# Patient Record
Sex: FEMALE | Race: WHITE | Employment: UNEMPLOYED | URBAN - METROPOLITAN AREA
[De-identification: names, ages, dates, MRNs, and addresses within clinical notes are randomized per-mention and may not be internally consistent; named-entity substitution may affect disease eponyms.]

---

## 2017-06-26 ENCOUNTER — OFFICE VISIT - HEALTHEAST (OUTPATIENT)
Dept: FAMILY MEDICINE | Facility: CLINIC | Age: 27
End: 2017-06-26

## 2017-06-26 DIAGNOSIS — F32.A DEPRESSION: ICD-10-CM

## 2017-06-26 DIAGNOSIS — F17.200 TOBACCO USE DISORDER: ICD-10-CM

## 2017-06-26 DIAGNOSIS — Z01.818 PRE-OPERATIVE GENERAL PHYSICAL EXAMINATION: ICD-10-CM

## 2017-06-26 DIAGNOSIS — E66.9 OBESITY (BMI 30-39.9): ICD-10-CM

## 2017-06-26 DIAGNOSIS — K58.2 IRRITABLE BOWEL SYNDROME WITH BOTH CONSTIPATION AND DIARRHEA: ICD-10-CM

## 2017-06-26 LAB
ATRIAL RATE - MUSE: 60 BPM
DIASTOLIC BLOOD PRESSURE - MUSE: NORMAL MMHG
INTERPRETATION ECG - MUSE: NORMAL
P AXIS - MUSE: 25 DEGREES
PR INTERVAL - MUSE: 158 MS
QRS DURATION - MUSE: 90 MS
QT - MUSE: 382 MS
QTC - MUSE: 382 MS
R AXIS - MUSE: 72 DEGREES
SYSTOLIC BLOOD PRESSURE - MUSE: NORMAL MMHG
T AXIS - MUSE: 45 DEGREES
VENTRICULAR RATE- MUSE: 60 BPM

## 2017-06-26 ASSESSMENT — MIFFLIN-ST. JEOR: SCORE: 1809.48

## 2017-07-06 ENCOUNTER — RECORDS - HEALTHEAST (OUTPATIENT)
Dept: ADMINISTRATIVE | Facility: OTHER | Age: 27
End: 2017-07-06

## 2017-07-06 ENCOUNTER — APPOINTMENT (OUTPATIENT)
Dept: GENERAL RADIOLOGY | Facility: CLINIC | Age: 27
End: 2017-07-06
Attending: EMERGENCY MEDICINE
Payer: COMMERCIAL

## 2017-07-06 ENCOUNTER — HOSPITAL ENCOUNTER (EMERGENCY)
Facility: CLINIC | Age: 27
Discharge: HOME | End: 2017-07-07
Attending: EMERGENCY MEDICINE | Admitting: EMERGENCY MEDICINE
Payer: COMMERCIAL

## 2017-07-06 DIAGNOSIS — R50.9 FEVER, UNSPECIFIED: ICD-10-CM

## 2017-07-06 LAB
ALBUMIN SERPL-MCNC: 3.5 G/DL (ref 3.4–5)
ALBUMIN UR-MCNC: NEGATIVE MG/DL
ALP SERPL-CCNC: 56 U/L (ref 40–150)
ALT SERPL W P-5'-P-CCNC: 25 U/L (ref 0–50)
ANION GAP SERPL CALCULATED.3IONS-SCNC: 6 MMOL/L (ref 3–14)
APPEARANCE UR: CLEAR
AST SERPL W P-5'-P-CCNC: 10 U/L (ref 0–45)
BACTERIA #/AREA URNS HPF: ABNORMAL /HPF
BASOPHILS # BLD AUTO: 0 10E9/L (ref 0–0.2)
BASOPHILS NFR BLD AUTO: 0.1 %
BILIRUB SERPL-MCNC: 0.7 MG/DL (ref 0.2–1.3)
BILIRUB UR QL STRIP: NEGATIVE
BUN SERPL-MCNC: 11 MG/DL (ref 7–30)
CALCIUM SERPL-MCNC: 8.8 MG/DL (ref 8.5–10.1)
CHLORIDE SERPL-SCNC: 103 MMOL/L (ref 94–109)
CO2 SERPL-SCNC: 27 MMOL/L (ref 20–32)
COLOR UR AUTO: YELLOW
CREAT SERPL-MCNC: 0.97 MG/DL (ref 0.52–1.04)
DIFFERENTIAL METHOD BLD: NORMAL
EOSINOPHIL # BLD AUTO: 0 10E9/L (ref 0–0.7)
EOSINOPHIL NFR BLD AUTO: 0.1 %
ERYTHROCYTE [DISTWIDTH] IN BLOOD BY AUTOMATED COUNT: 12.2 % (ref 10–15)
GFR SERPL CREATININE-BSD FRML MDRD: 68 ML/MIN/1.7M2
GLUCOSE SERPL-MCNC: 90 MG/DL (ref 70–99)
GLUCOSE UR STRIP-MCNC: NEGATIVE MG/DL
HCT VFR BLD AUTO: 38.1 % (ref 35–47)
HGB BLD-MCNC: 13.1 G/DL (ref 11.7–15.7)
HGB UR QL STRIP: ABNORMAL
IMM GRANULOCYTES # BLD: 0 10E9/L (ref 0–0.4)
IMM GRANULOCYTES NFR BLD: 0.3 %
KETONES UR STRIP-MCNC: 40 MG/DL
LACTATE BLD-SCNC: 1 MMOL/L (ref 0.7–2.1)
LEUKOCYTE ESTERASE UR QL STRIP: ABNORMAL
LYMPHOCYTES # BLD AUTO: 1.1 10E9/L (ref 0.8–5.3)
LYMPHOCYTES NFR BLD AUTO: 13.7 %
MCH RBC QN AUTO: 31.6 PG (ref 26.5–33)
MCHC RBC AUTO-ENTMCNC: 34.4 G/DL (ref 31.5–36.5)
MCV RBC AUTO: 92 FL (ref 78–100)
MONOCYTES # BLD AUTO: 0.6 10E9/L (ref 0–1.3)
MONOCYTES NFR BLD AUTO: 7.7 %
MUCOUS THREADS #/AREA URNS LPF: PRESENT /LPF
NEUTROPHILS # BLD AUTO: 6.2 10E9/L (ref 1.6–8.3)
NEUTROPHILS NFR BLD AUTO: 78.1 %
NITRATE UR QL: NEGATIVE
NRBC # BLD AUTO: 0 10*3/UL
NRBC BLD AUTO-RTO: 0 /100
PH UR STRIP: 6 PH (ref 5–7)
PLATELET # BLD AUTO: 197 10E9/L (ref 150–450)
POTASSIUM SERPL-SCNC: 3.4 MMOL/L (ref 3.4–5.3)
PROT SERPL-MCNC: 7.1 G/DL (ref 6.8–8.8)
RBC # BLD AUTO: 4.15 10E12/L (ref 3.8–5.2)
RBC #/AREA URNS AUTO: <1 /HPF (ref 0–2)
SODIUM SERPL-SCNC: 137 MMOL/L (ref 133–144)
SP GR UR STRIP: 1.02 (ref 1–1.03)
SQUAMOUS #/AREA URNS AUTO: 3 /HPF (ref 0–1)
TRANS CELLS #/AREA URNS HPF: <1 /HPF (ref 0–1)
URN SPEC COLLECT METH UR: ABNORMAL
UROBILINOGEN UR STRIP-MCNC: 2 MG/DL (ref 0–2)
WBC # BLD AUTO: 7.9 10E9/L (ref 4–11)
WBC #/AREA URNS AUTO: 1 /HPF (ref 0–2)

## 2017-07-06 PROCEDURE — 80053 COMPREHEN METABOLIC PANEL: CPT | Performed by: EMERGENCY MEDICINE

## 2017-07-06 PROCEDURE — 25000128 H RX IP 250 OP 636: Performed by: EMERGENCY MEDICINE

## 2017-07-06 PROCEDURE — 25000132 ZZH RX MED GY IP 250 OP 250 PS 637: Performed by: EMERGENCY MEDICINE

## 2017-07-06 PROCEDURE — 87040 BLOOD CULTURE FOR BACTERIA: CPT | Performed by: EMERGENCY MEDICINE

## 2017-07-06 PROCEDURE — 87086 URINE CULTURE/COLONY COUNT: CPT | Performed by: EMERGENCY MEDICINE

## 2017-07-06 PROCEDURE — 84703 CHORIONIC GONADOTROPIN ASSAY: CPT | Performed by: EMERGENCY MEDICINE

## 2017-07-06 PROCEDURE — 99285 EMERGENCY DEPT VISIT HI MDM: CPT | Mod: Z6 | Performed by: EMERGENCY MEDICINE

## 2017-07-06 PROCEDURE — 96360 HYDRATION IV INFUSION INIT: CPT

## 2017-07-06 PROCEDURE — 81001 URINALYSIS AUTO W/SCOPE: CPT | Performed by: EMERGENCY MEDICINE

## 2017-07-06 PROCEDURE — 85025 COMPLETE CBC W/AUTO DIFF WBC: CPT | Performed by: EMERGENCY MEDICINE

## 2017-07-06 PROCEDURE — 71010 XR CHEST PORT 1 VW: CPT

## 2017-07-06 PROCEDURE — 83605 ASSAY OF LACTIC ACID: CPT | Performed by: EMERGENCY MEDICINE

## 2017-07-06 PROCEDURE — 99285 EMERGENCY DEPT VISIT HI MDM: CPT | Mod: 25

## 2017-07-06 RX ORDER — ACETAMINOPHEN 500 MG
1000 TABLET ORAL ONCE
Status: COMPLETED | OUTPATIENT
Start: 2017-07-06 | End: 2017-07-06

## 2017-07-06 RX ORDER — KETOROLAC TROMETHAMINE 10 MG/1
30 TABLET, FILM COATED ORAL EVERY 8 HOURS PRN
COMMUNITY
End: 2020-12-10

## 2017-07-06 RX ORDER — SODIUM CHLORIDE 9 MG/ML
1000 INJECTION, SOLUTION INTRAVENOUS CONTINUOUS
Status: DISCONTINUED | OUTPATIENT
Start: 2017-07-06 | End: 2017-07-07 | Stop reason: HOSPADM

## 2017-07-06 RX ADMIN — SODIUM CHLORIDE 1000 ML: 9 INJECTION, SOLUTION INTRAVENOUS at 22:37

## 2017-07-06 RX ADMIN — ACETAMINOPHEN 1000 MG: 500 TABLET, FILM COATED ORAL at 22:54

## 2017-07-06 RX ADMIN — SODIUM CHLORIDE 1000 ML: 9 INJECTION, SOLUTION INTRAVENOUS at 23:11

## 2017-07-06 ASSESSMENT — ENCOUNTER SYMPTOMS
SHORTNESS OF BREATH: 0
FREQUENCY: 0
SORE THROAT: 0
DYSURIA: 0
FEVER: 1
HEMATURIA: 0
COUGH: 1

## 2017-07-06 NOTE — ED AVS SNAPSHOT
[facility], Emergency Department    [address] [address]    Phone:  [phone]                                       Jennifer Cantu   MRN: [id number]    Department:  [facility], Emergency Department   Date of Visit:  7/6/2017           Patient Information     Date Of Birth          1990        Your diagnoses for this visit were:     Fever, unspecified        You were seen by Phani Lopez MD and Ministerio Quintanilla MD.      24 Hour Appointment Hotline       To make an appointment at any [place] clinic, call [phone] ([phone]). If you don't have a family doctor or clinic, we will help you find one. [place] clinics are conveniently located to serve the needs of you and your family.             Review of your medicines      Our records show that you are taking the medicines listed below. If these are incorrect, please call your family doctor or clinic.        Dose / Directions Last dose taken    CODEINE SULFATE PO   Dose:  50 mg        Take 50 mg by mouth daily   Refills:  0        ketorolac 10 MG tablet   Commonly known as:  TORADOL   Dose:  30 mg        Take 30 mg by mouth every 8 hours as needed for moderate pain   Refills:  0        LEVAQUIN PO   Dose:  750 mg        Take 750 mg by mouth daily   Refills:  0        melatonin 3 MG tablet   Dose:  3 mg        Take 3 mg by mouth nightly as needed for sleep   Refills:  0        traZODone 50 MG tablet   Commonly known as:  DESYREL   Dose:   mg   Quantity:  60 tablet        Take 1-2 tablets ( mg) by mouth nightly as needed for sleep   Refills:  1                Procedures and tests performed during your visit     Blood Culture ONE site    Blood culture    CBC with platelets differential    CT Abdomen Pelvis w Contrast    Cardiac Continuous Monitoring    Comprehensive metabolic panel    HCG qualitative    Lactic acid    Patient care order    Peripheral IV: Standard    Pulse oximetry nursing    UA  "with Microscopic    Urine Culture Aerobic Bacterial    XR Chest Port 1 View      Orders Needing Specimen Collection     None      Pending Results     Date and Time Order Name Status Description    7/6/2017 2259 CT Abdomen Pelvis w Contrast Preliminary     7/6/2017 2243 XR Chest Port 1 View Preliminary     7/6/2017 2243 Blood culture Preliminary     7/6/2017 2243 Urine Culture Aerobic Bacterial Preliminary     7/6/2017 2236 Blood Culture ONE site Preliminary             Pending Culture Results     Date and Time Order Name Status Description    7/6/2017 2243 Blood culture Preliminary     7/6/2017 2243 Urine Culture Aerobic Bacterial Preliminary     7/6/2017 2236 Blood Culture ONE site Preliminary             Pending Results Instructions     If you had any lab results that were not finalized at the time of your Discharge, you can call the ED Lab Result RN at [phone]. You will be contacted by this team for any positive Lab results or changes in treatment. The nurses are available 7 days a week from 10A to 6:30P.  You can leave a message 24 hours per day and they will return your call.        Thank you for choosing [facility]       Thank you for choosing [facility] for your care. Our goal is always to provide you with excellent care. Hearing back from our patients is one way we can continue to improve our services. Please take a few minutes to complete the written survey that you may receive in the mail after you visit with us. Thank you!        [employer] Information     [employer] lets you send messages to your doctor, view your test results, renew your prescriptions, schedule appointments and more. To sign up, go to [email or web address] . Click on \"Log in\" on the left side of the screen, which will take you to the Welcome page. Then click on \"Sign up Now\" on the right side of the page.     You will be asked to enter the access code listed below, as well as some personal information. Please follow the directions to " create your username and password.     Your access code is: [email or web address]  Expires: 10/5/2017  3:06 AM     Your access code will  in 90 days. If you need help or a new code, please call your [facility] clinic or [phone].        Care EveryWhere ID     This is your Care EveryWhere ID. This could be used by other organizations to access your [facility] medical records  [id number]        Equal Access to Services     [facility]: Hadii keisha salgado hadasho Soomaali, waaxda luqadaha, qaybta kaalmada adeegyada, clarita greenwood . So [facility] [phone].    ATENCIÓN: Si habla español, tiene a santoro disposición servicios gratuitos de asistencia lingüística. Llame al [phone].    We comply with applicable federal civil rights laws and [place] laws. We do not discriminate on the basis of race, color, national origin, age, disability sex, sexual orientation or gender identity.            After Visit Summary       This is your record. Keep this with you and show to your community pharmacist(s) and doctor(s) at your next visit.

## 2017-07-06 NOTE — ED AVS SNAPSHOT
[facility], [place], Emergency Department    [address] [place] [address]    Phone:  [phone]                                       Jennifer Cantu   MRN: [id number]    Department:  [facility], Emergency Department   Date of Visit:  7/6/2017           After Visit Summary Signature Page     I have received my discharge instructions, and my questions have been answered. I have discussed any challenges I see with this plan with the nurse or doctor.    ..........................................................................................................................................  Patient/Patient Representative Signature      ..........................................................................................................................................  Patient Representative Print Name and Relationship to Patient    ..................................................               ................................................  Date                                            Time    ..........................................................................................................................................  Reviewed by Signature/Title    ...................................................              ..............................................  Date                                                            Time

## 2017-07-07 ENCOUNTER — APPOINTMENT (OUTPATIENT)
Dept: CT IMAGING | Facility: CLINIC | Age: 27
End: 2017-07-07
Attending: EMERGENCY MEDICINE
Payer: COMMERCIAL

## 2017-07-07 VITALS
BODY MASS INDEX: 34.8 KG/M2 | RESPIRATION RATE: 18 BRPM | WEIGHT: 229.6 LBS | TEMPERATURE: 98.4 F | HEART RATE: 97 BPM | DIASTOLIC BLOOD PRESSURE: 59 MMHG | HEIGHT: 68 IN | OXYGEN SATURATION: 99 % | SYSTOLIC BLOOD PRESSURE: 136 MMHG

## 2017-07-07 LAB — HCG SERPL QL: NEGATIVE

## 2017-07-07 PROCEDURE — 25000125 ZZHC RX 250: Performed by: EMERGENCY MEDICINE

## 2017-07-07 PROCEDURE — 25000128 H RX IP 250 OP 636: Performed by: EMERGENCY MEDICINE

## 2017-07-07 PROCEDURE — 74177 CT ABD & PELVIS W/CONTRAST: CPT

## 2017-07-07 RX ORDER — IOPAMIDOL 755 MG/ML
135 INJECTION, SOLUTION INTRAVASCULAR ONCE
Status: COMPLETED | OUTPATIENT
Start: 2017-07-07 | End: 2017-07-07

## 2017-07-07 RX ADMIN — IOPAMIDOL 135 ML: 755 INJECTION, SOLUTION INTRAVENOUS at 00:29

## 2017-07-07 RX ADMIN — SODIUM CHLORIDE, PRESERVATIVE FREE 84 ML: 5 INJECTION INTRAVENOUS at 00:29

## 2017-07-07 NOTE — ED PROVIDER NOTES
Ms. Cantu was signed over to my by my colleague Dr. oLpez at 0120 with plan for follow up on Surgical recommendations with likely disposition to home.  I did rediscuss the patients case with surgery who have evaluated her CT, lab studies, U/A, CXR and have completed a bedside examination with recommendations for the patient to return to home with follow up in Bariatric surgery on Tuesday of the upcoming week.  I did  the patient and mother as to common sources of infection and indications for emergent return.     Ministerio Quintanilla MD  07/07/17 6856

## 2017-07-07 NOTE — ED NOTES
Patient presents with c/o fever. Patient had vertical sleeve gastrectomy done in [place] on 7/4/17, returned today and developed fever. Temp max of 103 at home and temp of 102 during triage. Has not taken tylenol.

## 2017-07-07 NOTE — ED PROVIDER NOTES
History     Chief Complaint   Patient presents with     Fever     HPI  Jennifer Cantu is a 27 year old female who states she went to [place] to have bariatric surgery performed because it was less expensive.  Patient states that she spent 7/2/17 in a hotel room and had surgery the following evening, on 7/3/17.  Patient states that she was watched in the hotel by the surgical assistant until the morning of 7/6/17, at which point she was sent to the airport to [place].  Patient states that she started feeling feverish [place] in [facility].  Patient states that she made it to the [facility] and came to the ER immediately.  Patient states she has had a mild cough, but denies any sore throat, shortness of breath or chest pain.  Patient denies any increased significant pain at the surgical site.  She denies any UTI symptoms.    This part of the medical record was transcribed by Katie Gallegos Medical Scribe, from a dictation done by Phani Lopez MD.      Past Medical History:   Diagnosis Date     GI symptoms     PCP w/u pending. Diarrhea, constipation, abdominal cramping noted.      Substance abuse     Alcohol       Past Surgical History:   Procedure Laterality Date     CHOLECYSTECTOMY  2013     COLONOSCOPY  2015 2/2 chronic GI symptoms     GASTRECTOMY  07/04/2017       Family History   Problem Relation Age of Onset     CANCER No family hx of        Social History   Substance Use Topics     Smoking status: Current Every Day Smoker     Packs/day: 0.50     Years: 10.00     Types: Cigarettes     Smokeless tobacco: Never Used     Alcohol use 0.0 oz/week     0 Standard drinks or equivalent per week      Comment: sober for a year until this - Trying to recover from alcohol. Starte drinking 2 weeks ago - unable to stop by herself     Previous Medications    CODEINE SULFATE PO    Take 50 mg by mouth daily    KETOROLAC (TORADOL) 10 MG TABLET    Take 30 mg by mouth every 8 hours as  "needed for moderate pain    LEVOFLOXACIN (LEVAQUIN PO)    Take 750 mg by mouth daily    MELATONIN 3 MG TABLET    Take 3 mg by mouth nightly as needed for sleep    TRAZODONE (DESYREL) 50 MG TABLET    Take 1-2 tablets ( mg) by mouth nightly as needed for sleep        Allergies   Allergen Reactions     Amoxicillin Unknown     Clindamycin Rash     Penicillins Rash      I have reviewed the Medications, Allergies, Past Medical and Surgical History, and Social History in the Epic system.    Review of Systems   Constitutional: Positive for fever.   HENT: Negative for sore throat.    Respiratory: Positive for cough. Negative for shortness of breath.    Cardiovascular: Negative for chest pain.   Genitourinary: Negative for dysuria, frequency, hematuria and urgency.   All other systems reviewed and are negative.      Physical Exam   BP: 136/59  Heart Rate: 105  Temp: 102  F (38.9  C)  Resp: 18  Height: 172.7 cm (5' 8\")  Weight: 104.1 kg (229 lb 9.6 oz)  SpO2: 98 %  Physical Exam   Constitutional: She is oriented to person, place, and time.   Alert and conversant   HENT:   Head: Atraumatic.   Mouth/Throat: Oropharynx is clear and moist.   Eyes: EOM are normal. Pupils are equal, round, and reactive to light.   Neck: Neck supple.   Cardiovascular: Normal heart sounds.    Pulmonary/Chest: Breath sounds normal.   Abdominal: Soft. There is tenderness (minimal epigastric). There is no rebound and no guarding.   Musculoskeletal: She exhibits no edema or tenderness.   Neurological: She is alert and oriented to person, place, and time.   Grossly intact and symmetric   Skin: Skin is warm.   Psychiatric: She has a normal mood and affect.       ED Course     ED Course     Procedures        IV established for blood draw    Medications   0.9% sodium chloride BOLUS (0 mLs Intravenous Stopped 7/7/17 0012)     Followed by   0.9% sodium chloride infusion (not administered)   0.9% sodium chloride BOLUS (0 mLs Intravenous Stopped 7/6/17 " 2307)   acetaminophen (TYLENOL) tablet 1,000 mg (1,000 mg Oral Given 7/6/17 2254)   iohexol (OMNIPAQUE) solution 25 mL (25 mLs Oral Given 7/6/17 2309)   iopamidol (ISOVUE-370) solution 135 mL (135 mLs Intravenous Given 7/7/17 0029)   sodium chloride 0.9 % for CT scan flush dose 84 mL (84 mLs Intravenous Given 7/7/17 0029)       Results for orders placed or performed during the hospital encounter of 07/06/17   XR Chest Port 1 View    Narrative    Exam: XR CHEST PORT 1 VW  7/6/2017 10:54 PM      History: Fever    Comparison: None    Findings: The cardiac silhouette is within normal limits. Trachea is  midline. Pulmonary vasculature is unremarkable. There is no pleural  effusion. No pneumothorax. No new focal pulmonary airspace opacities.  Visualized abdomen is unremarkable.      Impression    Impression: Clear chest.   CT Abdomen Pelvis w Contrast    Narrative    Exam: CT abdomen pelvis with contrast 7/7/2017 12:28 AM.    History: 3 days status post gastric sleeve with fever, rule out  complication.    Comparison: None.    Technique: CT images from the lung bases through the symphysis pubis  after the uneventful administration of IV and oral contrast.    FINDINGS: Trace bibasilar atelectasis. Tiny left pleural effusion.  Heart size is normal. No pericardial effusion.    Postoperative changes of gastric sleeve. There is mild fat stranding  surrounding the postoperative changes.    Focal fatty infiltration near the falciform ligament of the liver. The  spleen, adrenals, and pancreas are unremarkable. Prior  cholecystectomy. No hydronephrosis or hydroureter. Unremarkable  urinary bladder. IUD within the uterus. Subtle nonspecific free fluid  in the pelvis and right lower quadrant. Normal appendix. There are a  few mildly dilated loops of small bowel within the left upper  quadrant. Patent abdominal vasculature. No significant retroperitoneal  or mesenteric lymphadenopathy. No free air. Tiny fat-containing  umbilical  hernia.    No suspicious bony lesions.      Impression    IMPRESSION:   1. Postoperative changes of gastric sleeve. There are subtle  inflammatory changes surrounding the stomach, likely representing  normal/expected operative changes.  2. Few mildly dilated loops of small bowel within the left upper  quadrant, likely representing focal ileus.  3. Subtle nonspecific free fluid within the right lower quadrant and  pelvis.   Comprehensive metabolic panel   Result Value Ref Range    Sodium 137 133 - 144 mmol/L    Potassium 3.4 3.4 - 5.3 mmol/L    Chloride 103 94 - 109 mmol/L    Carbon Dioxide 27 20 - 32 mmol/L    Anion Gap 6 3 - 14 mmol/L    Glucose 90 70 - 99 mg/dL    Urea Nitrogen 11 7 - 30 mg/dL    Creatinine 0.97 0.52 - 1.04 mg/dL    GFR Estimate 68 >60 mL/min/1.7m2    GFR Estimate If Black 82 >60 mL/min/1.7m2    Calcium 8.8 8.5 - 10.1 mg/dL    Bilirubin Total 0.7 0.2 - 1.3 mg/dL    Albumin 3.5 3.4 - 5.0 g/dL    Protein Total 7.1 6.8 - 8.8 g/dL    Alkaline Phosphatase 56 40 - 150 U/L    ALT 25 0 - 50 U/L    AST 10 0 - 45 U/L   CBC with platelets differential   Result Value Ref Range    WBC 7.9 4.0 - 11.0 10e9/L    RBC Count 4.15 3.8 - 5.2 10e12/L    Hemoglobin 13.1 11.7 - 15.7 g/dL    Hematocrit 38.1 35.0 - 47.0 %    MCV 92 78 - 100 fl    MCH 31.6 26.5 - 33.0 pg    MCHC 34.4 31.5 - 36.5 g/dL    RDW 12.2 10.0 - 15.0 %    Platelet Count 197 150 - 450 10e9/L    Diff Method Automated Method     % Neutrophils 78.1 %    % Lymphocytes 13.7 %    % Monocytes 7.7 %    % Eosinophils 0.1 %    % Basophils 0.1 %    % Immature Granulocytes 0.3 %    Nucleated RBCs 0 0 /100    Absolute Neutrophil 6.2 1.6 - 8.3 10e9/L    Absolute Lymphocytes 1.1 0.8 - 5.3 10e9/L    Absolute Monocytes 0.6 0.0 - 1.3 10e9/L    Absolute Eosinophils 0.0 0.0 - 0.7 10e9/L    Absolute Basophils 0.0 0.0 - 0.2 10e9/L    Abs Immature Granulocytes 0.0 0 - 0.4 10e9/L    Absolute Nucleated RBC 0.0    Lactic acid   Result Value Ref Range    Lactic Acid 1.0 0.7 -  2.1 mmol/L   UA with Microscopic   Result Value Ref Range    Color Urine Yellow     Appearance Urine Clear     Glucose Urine Negative NEG mg/dL    Bilirubin Urine Negative NEG    Ketones Urine 40 (A) NEG mg/dL    Specific Gravity Urine 1.016 1.003 - 1.035    Blood Urine Trace (A) NEG    pH Urine 6.0 5.0 - 7.0 pH    Protein Albumin Urine Negative NEG mg/dL    Urobilinogen mg/dL 2.0 0.0 - 2.0 mg/dL    Nitrite Urine Negative NEG    Leukocyte Esterase Urine Small (A) NEG    Source Midstream Urine     WBC Urine 1 0 - 2 /HPF    RBC Urine <1 0 - 2 /HPF    Bacteria Urine Few (A) NEG /HPF    Squamous Epithelial /HPF Urine 3 (H) 0 - 1 /HPF    Transitional Epi <1 0 - 1 /HPF    Mucous Urine Present (A) NEG /LPF   HCG qualitative   Result Value Ref Range    HCG Qualitative Serum Negative NEG   Blood Culture ONE site   Result Value Ref Range    Specimen Description Blood Right Arm     Culture Micro No growth after 1 hour     Micro Report Status Pending    Urine Culture Aerobic Bacterial   Result Value Ref Range    Specimen Description Midstream Urine     Special Requests Specimen received in preservative     Culture Micro Pending     Micro Report Status Pending    Blood culture   Result Value Ref Range    Specimen Description Blood Left Arm     Culture Micro No growth after 1 hour     Micro Report Status Pending        Labs Ordered and Resulted from Time of ED Arrival Up to the Time of Departure from the ED   ROUTINE UA WITH MICROSCOPIC - Abnormal; Notable for the following:        Result Value    Ketones Urine 40 (*)     Blood Urine Trace (*)     Leukocyte Esterase Urine Small (*)     Bacteria Urine Few (*)     Squamous Epithelial /HPF Urine 3 (*)     Mucous Urine Present (*)     All other components within normal limits   COMPREHENSIVE METABOLIC PANEL   CBC WITH PLATELETS DIFFERENTIAL   LACTIC ACID WHOLE BLOOD   HCG QUALITATIVE   PERIPHERAL IV CATHETER   PULSE OXIMETRY NURSING   CARDIAC CONTINUOUS MONITORING   PATIENT CARE  ORDER   MEASURE URINE OUTPUT   BLOOD CULTURE   URINE CULTURE AEROBIC BACTERIAL   BLOOD CULTURE         Assessments & Plan (with Medical Decision Making)     I have reviewed the nursing notes.    Workup fairly unremarkable but the case was discussed with surgery who will come to the ER to evaluate the patient.  At this time the patient's temperature has resolved and it is currently 98.4 orally.  Consideration for observation overnight upstairs will be undertaken.    I have reviewed the findings, diagnosis, and plan with the patient.    Working diagnoses:   Fever, unspecified     Surgery consultation pending.    Phani Lopez MD    7/6/2017   [facility], [place], EMERGENCY DEPARTMENT     Phani Lopez MD  07/07/17 0118

## 2017-07-07 NOTE — CONSULTS
GENERAL SURGERY H&P/CONSULT  July 7, 2017      HISTORY PRESENTING ILLNESS: Jennifer Cantu is a 27 year old female POD3 s/p lap sleep gastrectomy in [place] who presents from home after having a fever to 104. She denies any other symptoms. Reports pain has not changed or worsed, denies nausea, vomiting, dysuria, SOB, cough. She reports she has not had a bowel movement since surgery.    REVIEW OF SYSTEMS: As noted above. No headache, dizziness. No fever, chills. No chest pain or shortness of breath. No abdominal pain, nausea, vomiting. No urinary difficulties. No muscle or body aches.    PAST MEDICAL HISTORY:   Past Medical History:   Diagnosis Date     GI symptoms     PCP w/u pending. Diarrhea, constipation, abdominal cramping noted.      Substance abuse     Alcohol       SURGICAL HISTORY:   Past Surgical History:   Procedure Laterality Date     CHOLECYSTECTOMY  2013     COLONOSCOPY  2015 2/2 chronic GI symptoms     GASTRECTOMY  07/04/2017       SOCIAL HISTORY:   Social History     Social History     Marital status: Single     Spouse name: N/A     Number of children: N/A     Years of education: N/A     Occupational History     Not on file.     Social History Main Topics     Smoking status: Current Every Day Smoker     Packs/day: 0.50     Years: 10.00     Types: Cigarettes     Smokeless tobacco: Never Used     Alcohol use 0.0 oz/week     0 Standard drinks or equivalent per week      Comment: sober for a year until this - Trying to recover from alcohol. Starte drinking 2 weeks ago - unable to stop by herself     Drug use: No     Sexual activity: No     Other Topics Concern     Not on file     Social History Narrative    Intake 10/16/15:         Single. 1, daughter age 5. Lives with patient.         S/p [employer] degree. [employer] [facility] [employer].     Currently employed.         Tobacco use:     Alcohol abuse: Longest period of sobriety is 1 year. Most recently, sober x 2 months before starting drinking about 1  week ago. 2-7 drinks at a time. Sometimes will black out. No h/o seizures, DTs     Drug use: denies         FAMILY HISTORY: No bleeding/clotting disorders nor problems with anesthesia.     ALLERGIES:      Allergies   Allergen Reactions     Amoxicillin Unknown     Clindamycin Rash     Penicillins Rash       MEDICATIONS:    No current facility-administered medications on file prior to encounter.   Current Outpatient Prescriptions on File Prior to Encounter:  traZODone (DESYREL) 50 MG tablet Take 1-2 tablets ( mg) by mouth nightly as needed for sleep   melatonin 3 MG tablet Take 3 mg by mouth nightly as needed for sleep       PHYSICAL EXAMINATION:  Temp:  [98.4  F (36.9  C)-102  F (38.9  C)] 98.4  F (36.9  C)  Pulse:  [97] 97  Heart Rate:  [105] 105  Resp:  [18] 18  BP: (136)/(59) 136/59  SpO2:  [97 %-99 %] 99 %  General: Alert, well-appearing  in no acute distress.  HEENT: Normocephalic, atraumatic. Patent nares.   Neck: No cervical lymphadenopathy. No thyromegaly.   Chest wall: Symmetric thorax. No masses or tenderness to palpation.   Respiratory: Non-labored breathing. Lung sounds clear to auscultation bilaterally.   Cardiovascular: Regular rate and rhythm.   Gastrointestinal: Abdomen soft, non-distended, non-tender to palpation. Incisions c/d/i  Extremities: Moving all four extremities. No limb deformities. No pedal edema.   Skin: As noted above. No rashes or lesions appreciated.    LABS: Reviewed.   Arterial Blood Gases   No lab results found in last 7 days.  Complete Blood Count     Recent Labs  Lab 07/06/17 2234   WBC 7.9   HGB 13.1        Basic Metabolic Panel    Recent Labs  Lab 07/06/17 2234      POTASSIUM 3.4   CHLORIDE 103   CO2 27   BUN 11   CR 0.97   GLC 90     Liver Function Tests    Recent Labs  Lab 07/06/17 2234   AST 10   ALT 25   ALKPHOS 56   BILITOTAL 0.7   ALBUMIN 3.5     Pancreatic Enzymes  No lab results found in last 7 days.  Coagulation Profile  No lab results found in  last 7 days.      IMAGING:  Results for orders placed or performed during the hospital encounter of 07/06/17   XR Chest Port 1 View    Narrative    Exam: XR CHEST PORT 1 VW  7/6/2017 10:54 PM      History: Fever    Comparison: None    Findings: The cardiac silhouette is within normal limits. Trachea is  midline. Pulmonary vasculature is unremarkable. There is no pleural  effusion. No pneumothorax. No new focal pulmonary airspace opacities.  Visualized abdomen is unremarkable.      Impression    Impression: Clear chest.   CT Abdomen Pelvis w Contrast    Narrative    Exam: CT abdomen pelvis with contrast 7/7/2017 12:28 AM.    History: 3 days status post gastric sleeve with fever, rule out  complication.    Comparison: None.    Technique: CT images from the lung bases through the symphysis pubis  after the uneventful administration of IV and oral contrast.    FINDINGS: Trace bibasilar atelectasis. Tiny left pleural effusion.  Heart size is normal. No pericardial effusion.    Postoperative changes of gastric sleeve. There is mild fat stranding  surrounding the postoperative changes.    Focal fatty infiltration near the falciform ligament of the liver. The  spleen, adrenals, and pancreas are unremarkable. Prior  cholecystectomy. No hydronephrosis or hydroureter. Unremarkable  urinary bladder. IUD within the uterus. Subtle nonspecific free fluid  in the pelvis and right lower quadrant. Normal appendix. There are a  few mildly dilated loops of small bowel within the left upper  quadrant. Patent abdominal vasculature. No significant retroperitoneal  or mesenteric lymphadenopathy. No free air. Tiny fat-containing  umbilical hernia.    No suspicious bony lesions.      Impression    IMPRESSION:   1. Postoperative changes of gastric sleeve. There are subtle  inflammatory changes surrounding the stomach, likely representing  normal/expected operative changes.  2. Few mildly dilated loops of small bowel within the left  upper  quadrant, likely representing focal ileus.  3. Subtle nonspecific free fluid within the right lower quadrant and  pelvis.         CO-MORBIDITIES:   Fever, unspecified    ASSESSMENT: Jennifer Cantu is a 27 year old female POD # 3 s/p vertical sleeve gastrectomy in [place] with a postop fever.    PLAN:    - No remarkable cause of postop fever given UA, CXR, and CT with PO contrast. Okay to discharge to home  - Follow up with bariatric specialist as planned  - Recommended OTC stool softener/laxatives  - Instructed patient to return to ED with worsening abdominal pain, nausea, persistent fever, redness or drainage from incisions.    Patient findings and plan discussed with staff, Dr. Salgado  .    Deven Walker   Surgery PGY3  [phone]

## 2017-07-08 LAB
BACTERIA SPEC CULT: NO GROWTH
Lab: NORMAL
MICRO REPORT STATUS: NORMAL
SPECIMEN SOURCE: NORMAL

## 2017-07-11 ENCOUNTER — OFFICE VISIT - HEALTHEAST (OUTPATIENT)
Dept: SURGERY | Facility: CLINIC | Age: 27
End: 2017-07-11

## 2017-07-11 ENCOUNTER — AMBULATORY - HEALTHEAST (OUTPATIENT)
Dept: LAB | Facility: CLINIC | Age: 27
End: 2017-07-11

## 2017-07-11 DIAGNOSIS — G89.18 POST-OPERATIVE PAIN: ICD-10-CM

## 2017-07-11 DIAGNOSIS — E28.2 POLYCYSTIC OVARY SYNDROME: ICD-10-CM

## 2017-07-11 DIAGNOSIS — R00.0 TACHYCARDIA WITH HEART RATE 121-140 BEATS PER MINUTE: ICD-10-CM

## 2017-07-11 DIAGNOSIS — K91.2 POSTOPERATIVE MALABSORPTION: ICD-10-CM

## 2017-07-11 DIAGNOSIS — K59.00 CONSTIPATION: ICD-10-CM

## 2017-07-11 DIAGNOSIS — Z72.0 TOBACCO USE: ICD-10-CM

## 2017-07-11 ASSESSMENT — MIFFLIN-ST. JEOR: SCORE: 1740.54

## 2017-07-12 ENCOUNTER — COMMUNICATION - HEALTHEAST (OUTPATIENT)
Dept: SURGERY | Facility: CLINIC | Age: 27
End: 2017-07-12

## 2017-07-12 LAB
BACTERIA SPEC CULT: NO GROWTH
BACTERIA SPEC CULT: NO GROWTH
MICRO REPORT STATUS: NORMAL
MICRO REPORT STATUS: NORMAL
SPECIMEN SOURCE: NORMAL
SPECIMEN SOURCE: NORMAL

## 2017-07-13 ENCOUNTER — HOSPITAL ENCOUNTER (OUTPATIENT)
Dept: CT IMAGING | Facility: CLINIC | Age: 27
Discharge: HOME | End: 2017-07-13
Attending: FAMILY MEDICINE

## 2017-07-13 ENCOUNTER — AMBULATORY - HEALTHEAST (OUTPATIENT)
Dept: SURGERY | Facility: CLINIC | Age: 27
End: 2017-07-13

## 2017-07-13 DIAGNOSIS — Z78.9 RECENT HISTORY OF FOREIGN TRAVEL: ICD-10-CM

## 2017-07-13 DIAGNOSIS — Z98.84 S/P LAPAROSCOPIC SLEEVE GASTRECTOMY: ICD-10-CM

## 2017-07-13 DIAGNOSIS — J95.89: ICD-10-CM

## 2017-07-13 DIAGNOSIS — R00.0 TACHYCARDIA: ICD-10-CM

## 2017-07-13 DIAGNOSIS — G89.18 POST-OPERATIVE PAIN: ICD-10-CM

## 2017-07-13 DIAGNOSIS — R00.0 TACHYCARDIA WITH HEART RATE 121-140 BEATS PER MINUTE: ICD-10-CM

## 2017-07-13 DIAGNOSIS — Z72.0 TOBACCO USE: ICD-10-CM

## 2017-07-13 DIAGNOSIS — I97.89: ICD-10-CM

## 2017-07-24 ENCOUNTER — COMMUNICATION - HEALTHEAST (OUTPATIENT)
Dept: SURGERY | Facility: CLINIC | Age: 27
End: 2017-07-24

## 2017-08-15 ENCOUNTER — OFFICE VISIT - HEALTHEAST (OUTPATIENT)
Dept: SURGERY | Facility: CLINIC | Age: 27
End: 2017-08-15

## 2017-08-15 DIAGNOSIS — Z98.84 BARIATRIC SURGERY STATUS: ICD-10-CM

## 2017-08-15 DIAGNOSIS — Z71.3 DIETARY COUNSELING: ICD-10-CM

## 2017-08-15 DIAGNOSIS — E66.9 OBESITY (BMI 30-39.9): ICD-10-CM

## 2017-08-15 ASSESSMENT — MIFFLIN-ST. JEOR: SCORE: 1654.35

## 2017-09-21 ENCOUNTER — OFFICE VISIT - HEALTHEAST (OUTPATIENT)
Dept: SURGERY | Facility: CLINIC | Age: 27
End: 2017-09-21

## 2017-09-21 ENCOUNTER — AMBULATORY - HEALTHEAST (OUTPATIENT)
Dept: LAB | Facility: CLINIC | Age: 27
End: 2017-09-21

## 2017-09-21 ENCOUNTER — AMBULATORY - HEALTHEAST (OUTPATIENT)
Dept: SURGERY | Facility: CLINIC | Age: 27
End: 2017-09-21

## 2017-09-21 DIAGNOSIS — R63.8 ABNORMAL CRAVING: ICD-10-CM

## 2017-09-21 DIAGNOSIS — Z72.0 TOBACCO ABUSE: ICD-10-CM

## 2017-09-21 DIAGNOSIS — K91.2 POSTOPERATIVE MALABSORPTION: ICD-10-CM

## 2017-09-21 DIAGNOSIS — Z78.9 ON ENTERAL NUTRITION AT HOME: ICD-10-CM

## 2017-09-21 DIAGNOSIS — Z90.3 HISTORY OF SLEEVE GASTRECTOMY: ICD-10-CM

## 2017-09-21 ASSESSMENT — MIFFLIN-ST. JEOR: SCORE: 1602.19

## 2017-09-22 ENCOUNTER — AMBULATORY - HEALTHEAST (OUTPATIENT)
Dept: SURGERY | Facility: CLINIC | Age: 27
End: 2017-09-22

## 2017-09-22 DIAGNOSIS — K91.2 POSTOPERATIVE MALABSORPTION: ICD-10-CM

## 2017-09-22 DIAGNOSIS — Z78.9 ON ENTERAL NUTRITION AT HOME: ICD-10-CM

## 2017-09-22 DIAGNOSIS — Z90.3 HISTORY OF SLEEVE GASTRECTOMY: ICD-10-CM

## 2017-09-26 ENCOUNTER — COMMUNICATION - HEALTHEAST (OUTPATIENT)
Dept: SURGERY | Facility: CLINIC | Age: 27
End: 2017-09-26

## 2017-10-05 ENCOUNTER — COMMUNICATION - HEALTHEAST (OUTPATIENT)
Dept: SURGERY | Facility: CLINIC | Age: 27
End: 2017-10-05

## 2020-12-10 ENCOUNTER — HOSPITAL ENCOUNTER (INPATIENT)
Facility: CLINIC | Age: 30
LOS: 2 days | Discharge: HOME | DRG: 897 | End: 2020-12-12
Attending: EMERGENCY MEDICINE | Admitting: PSYCHIATRY & NEUROLOGY
Payer: COMMERCIAL

## 2020-12-10 DIAGNOSIS — F12.10 MARIJUANA ABUSE: ICD-10-CM

## 2020-12-10 DIAGNOSIS — F10.230 ALCOHOL DEPENDENCE WITH UNCOMPLICATED WITHDRAWAL (H): ICD-10-CM

## 2020-12-10 DIAGNOSIS — Z20.828 CONTACT WITH AND (SUSPECTED) EXPOSURE TO OTHER VIRAL COMMUNICABLE DISEASES: ICD-10-CM

## 2020-12-10 LAB
ALBUMIN SERPL-MCNC: 3.6 G/DL (ref 3.4–5)
ALCOHOL BREATH TEST: 0 (ref 0–0.01)
ALP SERPL-CCNC: 52 U/L (ref 40–150)
ALT SERPL W P-5'-P-CCNC: 24 U/L (ref 0–50)
AMPHETAMINES UR QL SCN: NEGATIVE
ANION GAP SERPL CALCULATED.3IONS-SCNC: <1 MMOL/L (ref 3–14)
AST SERPL W P-5'-P-CCNC: 14 U/L (ref 0–45)
BARBITURATES UR QL: NEGATIVE
BASOPHILS # BLD AUTO: 0.1 10E9/L (ref 0–0.2)
BASOPHILS NFR BLD AUTO: 0.5 %
BENZODIAZ UR QL: NEGATIVE
BILIRUB SERPL-MCNC: 0.4 MG/DL (ref 0.2–1.3)
BUN SERPL-MCNC: 15 MG/DL (ref 7–30)
CALCIUM SERPL-MCNC: 8.5 MG/DL (ref 8.5–10.1)
CANNABINOIDS UR QL SCN: POSITIVE
CHLORIDE SERPL-SCNC: 108 MMOL/L (ref 94–109)
CO2 SERPL-SCNC: 32 MMOL/L (ref 20–32)
COCAINE UR QL: NEGATIVE
CREAT SERPL-MCNC: 0.78 MG/DL (ref 0.52–1.04)
DIFFERENTIAL METHOD BLD: ABNORMAL
EOSINOPHIL # BLD AUTO: 0.1 10E9/L (ref 0–0.7)
EOSINOPHIL NFR BLD AUTO: 0.5 %
ERYTHROCYTE [DISTWIDTH] IN BLOOD BY AUTOMATED COUNT: 12.8 % (ref 10–15)
ETHANOL UR QL SCN: NEGATIVE
FLUAV+FLUBV RNA SPEC QL NAA+PROBE: NEGATIVE
FLUAV+FLUBV RNA SPEC QL NAA+PROBE: NEGATIVE
GFR SERPL CREATININE-BSD FRML MDRD: >90 ML/MIN/{1.73_M2}
GLUCOSE SERPL-MCNC: 94 MG/DL (ref 70–99)
HCG UR QL: NEGATIVE
HCT VFR BLD AUTO: 41.4 % (ref 35–47)
HGB BLD-MCNC: 13.7 G/DL (ref 11.7–15.7)
IMM GRANULOCYTES # BLD: 0 10E9/L (ref 0–0.4)
IMM GRANULOCYTES NFR BLD: 0.3 %
LABORATORY COMMENT REPORT: NORMAL
LYMPHOCYTES # BLD AUTO: 3.1 10E9/L (ref 0.8–5.3)
LYMPHOCYTES NFR BLD AUTO: 23.7 %
MCH RBC QN AUTO: 32.5 PG (ref 26.5–33)
MCHC RBC AUTO-ENTMCNC: 33.1 G/DL (ref 31.5–36.5)
MCV RBC AUTO: 98 FL (ref 78–100)
MONOCYTES # BLD AUTO: 0.9 10E9/L (ref 0–1.3)
MONOCYTES NFR BLD AUTO: 7 %
NEUTROPHILS # BLD AUTO: 8.7 10E9/L (ref 1.6–8.3)
NEUTROPHILS NFR BLD AUTO: 68 %
NRBC # BLD AUTO: 0 10*3/UL
NRBC BLD AUTO-RTO: 0 /100
OPIATES UR QL SCN: NEGATIVE
PLATELET # BLD AUTO: 386 10E9/L (ref 150–450)
POTASSIUM SERPL-SCNC: 4.6 MMOL/L (ref 3.4–5.3)
PROT SERPL-MCNC: 6.9 G/DL (ref 6.8–8.8)
RBC # BLD AUTO: 4.22 10E12/L (ref 3.8–5.2)
RSV RNA SPEC QL NAA+PROBE: NEGATIVE
SARS-COV-2 RNA SPEC QL NAA+PROBE: NEGATIVE
SODIUM SERPL-SCNC: 140 MMOL/L (ref 133–144)
SPECIMEN SOURCE: NORMAL
WBC # BLD AUTO: 12.9 10E9/L (ref 4–11)

## 2020-12-10 PROCEDURE — 87636 SARSCOV2 & INF A&B AMP PRB: CPT | Performed by: EMERGENCY MEDICINE

## 2020-12-10 PROCEDURE — 250N000013 HC RX MED GY IP 250 OP 250 PS 637: Performed by: NURSE PRACTITIONER

## 2020-12-10 PROCEDURE — C9803 HOPD COVID-19 SPEC COLLECT: HCPCS | Performed by: EMERGENCY MEDICINE

## 2020-12-10 PROCEDURE — 99285 EMERGENCY DEPT VISIT HI MDM: CPT | Performed by: EMERGENCY MEDICINE

## 2020-12-10 PROCEDURE — 80320 DRUG SCREEN QUANTALCOHOLS: CPT | Performed by: EMERGENCY MEDICINE

## 2020-12-10 PROCEDURE — 99284 EMERGENCY DEPT VISIT MOD MDM: CPT | Performed by: EMERGENCY MEDICINE

## 2020-12-10 PROCEDURE — 80307 DRUG TEST PRSMV CHEM ANLYZR: CPT | Performed by: EMERGENCY MEDICINE

## 2020-12-10 PROCEDURE — 250N000013 HC RX MED GY IP 250 OP 250 PS 637: Performed by: EMERGENCY MEDICINE

## 2020-12-10 PROCEDURE — 80053 COMPREHEN METABOLIC PANEL: CPT | Performed by: EMERGENCY MEDICINE

## 2020-12-10 PROCEDURE — 82075 ASSAY OF BREATH ETHANOL: CPT | Performed by: EMERGENCY MEDICINE

## 2020-12-10 PROCEDURE — 128N000004 HC R&B CD ADULT

## 2020-12-10 PROCEDURE — 81025 URINE PREGNANCY TEST: CPT | Performed by: EMERGENCY MEDICINE

## 2020-12-10 PROCEDURE — 85025 COMPLETE CBC W/AUTO DIFF WBC: CPT | Performed by: EMERGENCY MEDICINE

## 2020-12-10 RX ORDER — HYDROXYZINE HYDROCHLORIDE 25 MG/1
25 TABLET, FILM COATED ORAL EVERY 4 HOURS PRN
Status: DISCONTINUED | OUTPATIENT
Start: 2020-12-10 | End: 2020-12-12 | Stop reason: HOSPADM

## 2020-12-10 RX ORDER — IBUPROFEN 200 MG
200 TABLET ORAL EVERY 6 HOURS PRN
Status: DISCONTINUED | OUTPATIENT
Start: 2020-12-10 | End: 2020-12-10

## 2020-12-10 RX ORDER — POLYETHYLENE GLYCOL 3350 17 G
2 POWDER IN PACKET (EA) ORAL
Status: DISCONTINUED | OUTPATIENT
Start: 2020-12-10 | End: 2020-12-12 | Stop reason: HOSPADM

## 2020-12-10 RX ORDER — DIAZEPAM 5 MG
5-20 TABLET ORAL EVERY 30 MIN PRN
Status: DISCONTINUED | OUTPATIENT
Start: 2020-12-10 | End: 2020-12-10

## 2020-12-10 RX ORDER — TRAZODONE HYDROCHLORIDE 50 MG/1
50 TABLET, FILM COATED ORAL
Status: DISCONTINUED | OUTPATIENT
Start: 2020-12-10 | End: 2020-12-12 | Stop reason: HOSPADM

## 2020-12-10 RX ORDER — FOLIC ACID 1 MG/1
1 TABLET ORAL DAILY
Status: DISCONTINUED | OUTPATIENT
Start: 2020-12-10 | End: 2020-12-12 | Stop reason: HOSPADM

## 2020-12-10 RX ORDER — ATENOLOL 50 MG/1
50 TABLET ORAL DAILY PRN
Status: DISCONTINUED | OUTPATIENT
Start: 2020-12-10 | End: 2020-12-12 | Stop reason: HOSPADM

## 2020-12-10 RX ORDER — AMOXICILLIN 250 MG
1 CAPSULE ORAL 2 TIMES DAILY PRN
Status: DISCONTINUED | OUTPATIENT
Start: 2020-12-10 | End: 2020-12-12 | Stop reason: HOSPADM

## 2020-12-10 RX ORDER — ACETAMINOPHEN 325 MG/1
650 TABLET ORAL EVERY 4 HOURS PRN
Status: DISCONTINUED | OUTPATIENT
Start: 2020-12-10 | End: 2020-12-12 | Stop reason: HOSPADM

## 2020-12-10 RX ORDER — LOPERAMIDE HCL 2 MG
2 CAPSULE ORAL 4 TIMES DAILY PRN
Status: DISCONTINUED | OUTPATIENT
Start: 2020-12-10 | End: 2020-12-12 | Stop reason: HOSPADM

## 2020-12-10 RX ORDER — ONDANSETRON 4 MG/1
4 TABLET, ORALLY DISINTEGRATING ORAL EVERY 6 HOURS PRN
Status: DISCONTINUED | OUTPATIENT
Start: 2020-12-10 | End: 2020-12-12 | Stop reason: HOSPADM

## 2020-12-10 RX ORDER — LANOLIN ALCOHOL/MO/W.PET/CERES
100 CREAM (GRAM) TOPICAL DAILY
Status: DISCONTINUED | OUTPATIENT
Start: 2020-12-10 | End: 2020-12-12 | Stop reason: HOSPADM

## 2020-12-10 RX ORDER — OMEPRAZOLE 20 MG/1
20 TABLET, DELAYED RELEASE ORAL DAILY
COMMUNITY
End: 2020-12-10

## 2020-12-10 RX ORDER — NICOTINE 21 MG/24HR
1 PATCH, TRANSDERMAL 24 HOURS TRANSDERMAL DAILY
Status: DISCONTINUED | OUTPATIENT
Start: 2020-12-10 | End: 2020-12-12 | Stop reason: HOSPADM

## 2020-12-10 RX ORDER — DIAZEPAM 5 MG
5-20 TABLET ORAL EVERY 30 MIN PRN
Status: DISCONTINUED | OUTPATIENT
Start: 2020-12-10 | End: 2020-12-12 | Stop reason: HOSPADM

## 2020-12-10 RX ORDER — MAGNESIUM HYDROXIDE/ALUMINUM HYDROXICE/SIMETHICONE 120; 1200; 1200 MG/30ML; MG/30ML; MG/30ML
30 SUSPENSION ORAL EVERY 4 HOURS PRN
Status: DISCONTINUED | OUTPATIENT
Start: 2020-12-10 | End: 2020-12-12 | Stop reason: HOSPADM

## 2020-12-10 RX ORDER — DIAZEPAM 5 MG
5 TABLET ORAL ONCE
Status: COMPLETED | OUTPATIENT
Start: 2020-12-10 | End: 2020-12-10

## 2020-12-10 RX ORDER — MULTIPLE VITAMINS W/ MINERALS TAB 9MG-400MCG
1 TAB ORAL DAILY
Status: DISCONTINUED | OUTPATIENT
Start: 2020-12-10 | End: 2020-12-12 | Stop reason: HOSPADM

## 2020-12-10 RX ADMIN — Medication 100 MG: at 20:07

## 2020-12-10 RX ADMIN — NICOTINE POLACRILEX 2 MG: 2 LOZENGE ORAL at 20:07

## 2020-12-10 RX ADMIN — FOLIC ACID 1 MG: 1 TABLET ORAL at 20:07

## 2020-12-10 RX ADMIN — MULTIPLE VITAMINS W/ MINERALS TAB 1 TABLET: TAB at 20:07

## 2020-12-10 RX ADMIN — HYDROXYZINE HYDROCHLORIDE 25 MG: 25 TABLET, FILM COATED ORAL at 22:32

## 2020-12-10 RX ADMIN — TRAZODONE HYDROCHLORIDE 50 MG: 50 TABLET ORAL at 22:32

## 2020-12-10 RX ADMIN — NICOTINE 1 PATCH: 21 PATCH, EXTENDED RELEASE TRANSDERMAL at 20:07

## 2020-12-10 RX ADMIN — DIAZEPAM 5 MG: 5 TABLET ORAL at 15:18

## 2020-12-10 ASSESSMENT — ENCOUNTER SYMPTOMS
HALLUCINATIONS: 0
RESPIRATORY NEGATIVE: 1
FEVER: 0
SLEEP DISTURBANCE: 0
CHILLS: 0

## 2020-12-10 ASSESSMENT — ACTIVITIES OF DAILY LIVING (ADL)
LAUNDRY: UNABLE TO COMPLETE
ORAL_HYGIENE: INDEPENDENT
HYGIENE/GROOMING: INDEPENDENT
DRESS: INDEPENDENT

## 2020-12-10 ASSESSMENT — MIFFLIN-ST. JEOR: SCORE: 1529.61

## 2020-12-10 NOTE — PHARMACY-ADMISSION MEDICATION HISTORY
Admission medication history interview status for the 12/10/2020 admission is complete. See Epic admission navigator for allergy information, pharmacy, prior to admission medications and immunization status.     Medication history interview sources:  patient, Care Everywhere    Changes made to PTA medication list (reason)  Added: none  Deleted: codeine, ketorolac, levofloxacin, melatonin, trazodone (all old entries pt is no longer on)  Changed: omeprazole 20mg daily to 40mg daily (per pt)    Additional medication history information (including reliability of information, actions taken by pharmacist):  -Patient only reports taking omeprazole. No other PRN meds, OTC meds, etc.    Prior to Admission medications    Medication Sig Last Dose Taking? Auth Provider   omeprazole (PRILOSEC) 20 MG DR capsule Take 40 mg by mouth daily 12/10/2020 at 0700 Yes Unknown, Entered By History     Medication history completed by: Trina Tom, PharmD, BCPS

## 2020-12-10 NOTE — ED PROVIDER NOTES
[facility] EMERGENCY DEPARTMENT ([place])     December 10, 2020    History     Chief Complaint   Patient presents with     Drug / Alcohol Assessment     looking for detox off of alcohol, last drink 2 hours ago     HPI  Jennifer Cantu is a 30 year old female with a past medical history significant for alcohol abuse, depression who presents here to the Emergency Department seeking detox from alcohol. Patient reports she drinks 2 bottles of champagne or wine daily, as well as mixed drinks with vodka.  She says he last drank last pm but then had one bloody landry this morning to feel better.  She tried going through home detox 2 weeks ago but was very sick with n/v,diarrhea and shakes. She cannot go through that again.  She is drinking lately and has been doing that for months.  She denies si/hi.  She denies known history of seizures with withdrawals. She has hx of BPD, bipolar, depression and anxiety and has been off of meds since this past March.  She says she uses thc a couple of times per week.       PAST MEDICAL HISTORY:   Past Medical History:   Diagnosis Date     GI symptoms     PCP w/u pending. Diarrhea, constipation, abdominal cramping noted.      Substance abuse (H)     Alcohol       PAST SURGICAL HISTORY:   Past Surgical History:   Procedure Laterality Date     CHOLECYSTECTOMY  2013     COLONOSCOPY  2015 2/2 chronic GI symptoms     GASTRECTOMY  07/04/2017       Past medical history, past surgical history, medications, and allergies were reviewed with the patient. Additional pertinent items: None    FAMILY HISTORY:   Family History   Problem Relation Age of Onset     Cancer No family hx of        SOCIAL HISTORY:   Social History     Tobacco Use     Smoking status: Current Every Day Smoker     Packs/day: 0.50     Years: 10.00     Pack years: 5.00     Types: Cigarettes     Smokeless tobacco: Never Used   Substance Use Topics     Alcohol use: Yes     Alcohol/week: 0.0 standard drinks     Comment:  drinks 2 bottle of champage or more a day, last drink 2 hours ago     Social history was reviewed with the patient. Additional pertinent items: None      Patient's Medications   New Prescriptions    No medications on file   Previous Medications    OMEPRAZOLE (PRILOSEC) 20 MG DR CAPSULE    Take 40 mg by mouth daily   Modified Medications    No medications on file   Discontinued Medications    CODEINE SULFATE PO    Take 50 mg by mouth daily    KETOROLAC (TORADOL) 10 MG TABLET    Take 30 mg by mouth every 8 hours as needed for moderate pain    LEVOFLOXACIN (LEVAQUIN PO)    Take 750 mg by mouth daily    MELATONIN 3 MG TABLET    Take 3 mg by mouth nightly as needed for sleep    OMEPRAZOLE (PRILOSEC OTC) 20 MG EC TABLET    Take 20 mg by mouth daily    TRAZODONE (DESYREL) 50 MG TABLET    Take 1-2 tablets ( mg) by mouth nightly as needed for sleep          Allergies   Allergen Reactions     Amoxicillin Unknown     Clindamycin Rash     Penicillins Rash        Review of Systems   Constitutional: Negative for chills and fever.   Respiratory: Negative.    Psychiatric/Behavioral: Negative for hallucinations, self-injury, sleep disturbance and suicidal ideas.   All other systems reviewed and are negative.    A complete review of systems was performed with pertinent positives and negatives noted in the HPI, and all other systems negative.    Physical Exam   BP: 119/70  Pulse: 85  Temp: 98.2  F (36.8  C)  Resp: 18  Weight: 77.1 kg (170 lb)  SpO2: 91 %      Physical Exam  Vitals signs and nursing note reviewed.   HENT:      Head: Normocephalic and atraumatic.      Nose: Nose normal.   Eyes:      General: No scleral icterus.     Extraocular Movements: Extraocular movements intact.   Cardiovascular:      Rate and Rhythm: Normal rate.   Pulmonary:      Effort: Pulmonary effort is normal.   Musculoskeletal: Normal range of motion.   Skin:     General: Skin is warm and dry.   Neurological:      General: No focal deficit present.       Mental Status: She is alert and oriented to person, place, and time.   Psychiatric:         Mood and Affect: Mood normal.         Behavior: Behavior normal.         Thought Content: Thought content normal.         Judgment: Judgment normal.         ED Course        Procedures           Results for orders placed or performed during the hospital encounter of 12/10/20 (from the past 24 hour(s))   Alcohol breath test POCT   Result Value Ref Range    Alcohol Breath Test 0.00 0.00 - 0.01   Drug abuse screen 6 urine (tox)   Result Value Ref Range    Amphetamine Qual Urine Negative NEG^Negative    Barbiturates Qual Urine Negative NEG^Negative    Benzodiazepine Qual Urine Negative NEG^Negative    Cannabinoids Qual Urine Positive (A) NEG^Negative    Cocaine Qual Urine Negative NEG^Negative    Ethanol Qual Urine Negative NEG^Negative    Opiates Qualitative Urine Negative NEG^Negative   HCG qualitative urine (UPT)   Result Value Ref Range    HCG Qual Urine Negative NEG^Negative   Asymptomatic Influenza A/B & SARS-CoV2 (COVID-19) Virus PCR Multiplex    Specimen: Nasopharyngeal   Result Value Ref Range    Flu A/B & SARS-COV-2 PCR Source Nasopharyngeal     SARS-CoV-2 PCR Result NEGATIVE     Influenza A PCR Negative NEG^Negative    Influenza B PCR Negative NEG^Negative    Respiratory Syncytial Virus PCR Negative NEG^Negative    Flu A/B & SARS-CoV-2 PCR Comment (Note)    CBC with platelets differential   Result Value Ref Range    WBC 12.9 (H) 4.0 - 11.0 10e9/L    RBC Count 4.22 3.8 - 5.2 10e12/L    Hemoglobin 13.7 11.7 - 15.7 g/dL    Hematocrit 41.4 35.0 - 47.0 %    MCV 98 78 - 100 fl    MCH 32.5 26.5 - 33.0 pg    MCHC 33.1 31.5 - 36.5 g/dL    RDW 12.8 10.0 - 15.0 %    Platelet Count 386 150 - 450 10e9/L    Diff Method Automated Method     % Neutrophils 68.0 %    % Lymphocytes 23.7 %    % Monocytes 7.0 %    % Eosinophils 0.5 %    % Basophils 0.5 %    % Immature Granulocytes 0.3 %    Nucleated RBCs 0 0 /100    Absolute Neutrophil  8.7 (H) 1.6 - 8.3 10e9/L    Absolute Lymphocytes 3.1 0.8 - 5.3 10e9/L    Absolute Monocytes 0.9 0.0 - 1.3 10e9/L    Absolute Eosinophils 0.1 0.0 - 0.7 10e9/L    Absolute Basophils 0.1 0.0 - 0.2 10e9/L    Abs Immature Granulocytes 0.0 0 - 0.4 10e9/L    Absolute Nucleated RBC 0.0    Comprehensive metabolic panel   Result Value Ref Range    Sodium 140 133 - 144 mmol/L    Potassium 4.6 3.4 - 5.3 mmol/L    Chloride 108 94 - 109 mmol/L    Carbon Dioxide 32 20 - 32 mmol/L    Anion Gap <1 (L) 3 - 14 mmol/L    Glucose 94 70 - 99 mg/dL    Urea Nitrogen 15 7 - 30 mg/dL    Creatinine 0.78 0.52 - 1.04 mg/dL    GFR Estimate >90 >60 mL/min/[1.73_m2]    GFR Estimate If Black >90 >60 mL/min/[1.73_m2]    Calcium 8.5 8.5 - 10.1 mg/dL    Bilirubin Total 0.4 0.2 - 1.3 mg/dL    Albumin 3.6 3.4 - 5.0 g/dL    Protein Total 6.9 6.8 - 8.8 g/dL    Alkaline Phosphatase 52 40 - 150 U/L    ALT 24 0 - 50 U/L    AST 14 0 - 45 U/L     Medications   diazepam (VALIUM) tablet 5 mg (5 mg Oral Given 12/10/20 2518)           Results for orders placed or performed during the hospital encounter of 12/10/20   Drug abuse screen 6 urine (tox)     Status: Abnormal   Result Value Ref Range    Amphetamine Qual Urine Negative NEG^Negative    Barbiturates Qual Urine Negative NEG^Negative    Benzodiazepine Qual Urine Negative NEG^Negative    Cannabinoids Qual Urine Positive (A) NEG^Negative    Cocaine Qual Urine Negative NEG^Negative    Ethanol Qual Urine Negative NEG^Negative    Opiates Qualitative Urine Negative NEG^Negative   CBC with platelets differential     Status: Abnormal   Result Value Ref Range    WBC 12.9 (H) 4.0 - 11.0 10e9/L    RBC Count 4.22 3.8 - 5.2 10e12/L    Hemoglobin 13.7 11.7 - 15.7 g/dL    Hematocrit 41.4 35.0 - 47.0 %    MCV 98 78 - 100 fl    MCH 32.5 26.5 - 33.0 pg    MCHC 33.1 31.5 - 36.5 g/dL    RDW 12.8 10.0 - 15.0 %    Platelet Count 386 150 - 450 10e9/L    Diff Method Automated Method     % Neutrophils 68.0 %    % Lymphocytes 23.7 %     % Monocytes 7.0 %    % Eosinophils 0.5 %    % Basophils 0.5 %    % Immature Granulocytes 0.3 %    Nucleated RBCs 0 0 /100    Absolute Neutrophil 8.7 (H) 1.6 - 8.3 10e9/L    Absolute Lymphocytes 3.1 0.8 - 5.3 10e9/L    Absolute Monocytes 0.9 0.0 - 1.3 10e9/L    Absolute Eosinophils 0.1 0.0 - 0.7 10e9/L    Absolute Basophils 0.1 0.0 - 0.2 10e9/L    Abs Immature Granulocytes 0.0 0 - 0.4 10e9/L    Absolute Nucleated RBC 0.0    Comprehensive metabolic panel     Status: Abnormal   Result Value Ref Range    Sodium 140 133 - 144 mmol/L    Potassium 4.6 3.4 - 5.3 mmol/L    Chloride 108 94 - 109 mmol/L    Carbon Dioxide 32 20 - 32 mmol/L    Anion Gap <1 (L) 3 - 14 mmol/L    Glucose 94 70 - 99 mg/dL    Urea Nitrogen 15 7 - 30 mg/dL    Creatinine 0.78 0.52 - 1.04 mg/dL    GFR Estimate >90 >60 mL/min/[1.73_m2]    GFR Estimate If Black >90 >60 mL/min/[1.73_m2]    Calcium 8.5 8.5 - 10.1 mg/dL    Bilirubin Total 0.4 0.2 - 1.3 mg/dL    Albumin 3.6 3.4 - 5.0 g/dL    Protein Total 6.9 6.8 - 8.8 g/dL    Alkaline Phosphatase 52 40 - 150 U/L    ALT 24 0 - 50 U/L    AST 14 0 - 45 U/L   Asymptomatic Influenza A/B & SARS-CoV2 (COVID-19) Virus PCR Multiplex     Status: None    Specimen: Nasopharyngeal   Result Value Ref Range    Flu A/B & SARS-COV-2 PCR Source Nasopharyngeal     SARS-CoV-2 PCR Result NEGATIVE     Influenza A PCR Negative NEG^Negative    Influenza B PCR Negative NEG^Negative    Respiratory Syncytial Virus PCR Negative NEG^Negative    Flu A/B & SARS-CoV-2 PCR Comment (Note)    HCG qualitative urine (UPT)     Status: None   Result Value Ref Range    HCG Qual Urine Negative NEG^Negative   Alcohol breath test POCT     Status: Normal   Result Value Ref Range    Alcohol Breath Test 0.00 0.00 - 0.01       Assessments & Plan (with Medical Decision Making)   The patient presents to the ED seeking detox for alcohol dependence.  She says she drinks daily and cannot stop on her own.  She feels ill when trying to stop and tried to do so  recently.  Labs were ordered and reviewed and she is medically appropriate for admission to detox. She is starting to have sweats and was given valium 5 mg po after upt was negative. She says she also uses thc a couple of times per week.   This is a voluntary admit.     I have reviewed the nursing notes.    I have reviewed the findings, diagnosis, plan and need for follow up with the patient.    New Prescriptions    No medications on file       Final diagnoses:   Alcohol dependence with uncomplicated withdrawal (H)   Marijuana abuse     --  Kaela Johnson MD  12/10/2020   [facility] EMERGENCY DEPARTMENT     Kaela Johnson MD  12/10/20 2935

## 2020-12-10 NOTE — ED NOTES
Pt presents to the ED voluntarily looking for detox off of alcohol. Pt states she drinks around 2 bottles of champagne a day. Pt denies any known seizure history with withdrawals. Pt denies any suicidal or homicidal ideations. Pt given behavioral mask.

## 2020-12-11 LAB
ALBUMIN UR-MCNC: NEGATIVE MG/DL
APPEARANCE UR: CLEAR
BILIRUB UR QL STRIP: NEGATIVE
CHOLEST SERPL-MCNC: 151 MG/DL
COLOR UR AUTO: YELLOW
ERYTHROCYTE [DISTWIDTH] IN BLOOD BY AUTOMATED COUNT: 12.3 % (ref 10–15)
FOLATE SERPL-MCNC: 15 NG/ML
GGT SERPL-CCNC: 17 U/L (ref 0–40)
GLUCOSE UR STRIP-MCNC: NEGATIVE MG/DL
HCT VFR BLD AUTO: 43.2 % (ref 35–47)
HDLC SERPL-MCNC: 62 MG/DL
HGB BLD-MCNC: 14.2 G/DL (ref 11.7–15.7)
HGB UR QL STRIP: NEGATIVE
KETONES UR STRIP-MCNC: NEGATIVE MG/DL
LDLC SERPL CALC-MCNC: 72 MG/DL
LEUKOCYTE ESTERASE UR QL STRIP: NEGATIVE
MCH RBC QN AUTO: 32.2 PG (ref 26.5–33)
MCHC RBC AUTO-ENTMCNC: 32.9 G/DL (ref 31.5–36.5)
MCV RBC AUTO: 98 FL (ref 78–100)
NITRATE UR QL: NEGATIVE
NONHDLC SERPL-MCNC: 89 MG/DL
PH UR STRIP: 6.5 PH (ref 5–7)
PLATELET # BLD AUTO: 391 10E9/L (ref 150–450)
RBC # BLD AUTO: 4.41 10E12/L (ref 3.8–5.2)
RBC #/AREA URNS AUTO: <1 /HPF (ref 0–2)
SOURCE: NORMAL
SP GR UR STRIP: 1.02 (ref 1–1.03)
SPECIMEN SOURCE: NORMAL
SQUAMOUS #/AREA URNS AUTO: 1 /HPF (ref 0–1)
TRIGL SERPL-MCNC: 86 MG/DL
TSH SERPL DL<=0.005 MIU/L-ACNC: 2.27 MU/L (ref 0.4–4)
UROBILINOGEN UR STRIP-MCNC: 2 MG/DL (ref 0–2)
WBC # BLD AUTO: 8.9 10E9/L (ref 4–11)
WBC #/AREA URNS AUTO: <1 /HPF (ref 0–5)
WET PREP SPEC: NORMAL

## 2020-12-11 PROCEDURE — 85027 COMPLETE CBC AUTOMATED: CPT | Performed by: PHYSICIAN ASSISTANT

## 2020-12-11 PROCEDURE — 99207 PR CONSULT E&M CHANGED TO INITIAL LEVEL: CPT | Performed by: PHYSICIAN ASSISTANT

## 2020-12-11 PROCEDURE — 82746 ASSAY OF FOLIC ACID SERUM: CPT | Performed by: NURSE PRACTITIONER

## 2020-12-11 PROCEDURE — HZ2ZZZZ DETOXIFICATION SERVICES FOR SUBSTANCE ABUSE TREATMENT: ICD-10-PCS | Performed by: PSYCHIATRY & NEUROLOGY

## 2020-12-11 PROCEDURE — 81001 URINALYSIS AUTO W/SCOPE: CPT | Performed by: PHYSICIAN ASSISTANT

## 2020-12-11 PROCEDURE — 99222 1ST HOSP IP/OBS MODERATE 55: CPT | Performed by: PHYSICIAN ASSISTANT

## 2020-12-11 PROCEDURE — 128N000004 HC R&B CD ADULT

## 2020-12-11 PROCEDURE — 84443 ASSAY THYROID STIM HORMONE: CPT | Performed by: NURSE PRACTITIONER

## 2020-12-11 PROCEDURE — 93010 ELECTROCARDIOGRAM REPORT: CPT | Performed by: INTERNAL MEDICINE

## 2020-12-11 PROCEDURE — 87210 SMEAR WET MOUNT SALINE/INK: CPT | Performed by: PHYSICIAN ASSISTANT

## 2020-12-11 PROCEDURE — 80061 LIPID PANEL: CPT | Performed by: NURSE PRACTITIONER

## 2020-12-11 PROCEDURE — 250N000013 HC RX MED GY IP 250 OP 250 PS 637: Performed by: NURSE PRACTITIONER

## 2020-12-11 PROCEDURE — 36415 COLL VENOUS BLD VENIPUNCTURE: CPT | Performed by: NURSE PRACTITIONER

## 2020-12-11 PROCEDURE — 90791 PSYCH DIAGNOSTIC EVALUATION: CPT | Performed by: SOCIAL WORKER

## 2020-12-11 PROCEDURE — 93005 ELECTROCARDIOGRAM TRACING: CPT

## 2020-12-11 PROCEDURE — 36415 COLL VENOUS BLD VENIPUNCTURE: CPT | Performed by: PHYSICIAN ASSISTANT

## 2020-12-11 PROCEDURE — 82977 ASSAY OF GGT: CPT | Performed by: NURSE PRACTITIONER

## 2020-12-11 PROCEDURE — 99222 1ST HOSP IP/OBS MODERATE 55: CPT | Mod: 95 | Performed by: PSYCHIATRY & NEUROLOGY

## 2020-12-11 RX ADMIN — OMEPRAZOLE 40 MG: 20 CAPSULE, DELAYED RELEASE ORAL at 08:37

## 2020-12-11 RX ADMIN — ACETAMINOPHEN 650 MG: 325 TABLET, FILM COATED ORAL at 19:01

## 2020-12-11 RX ADMIN — NICOTINE 1 PATCH: 21 PATCH, EXTENDED RELEASE TRANSDERMAL at 08:36

## 2020-12-11 RX ADMIN — TRAZODONE HYDROCHLORIDE 50 MG: 50 TABLET ORAL at 22:21

## 2020-12-11 RX ADMIN — Medication 100 MG: at 08:37

## 2020-12-11 RX ADMIN — MULTIPLE VITAMINS W/ MINERALS TAB 1 TABLET: TAB at 08:37

## 2020-12-11 RX ADMIN — FOLIC ACID 1 MG: 1 TABLET ORAL at 08:37

## 2020-12-11 RX ADMIN — NICOTINE POLACRILEX 2 MG: 2 LOZENGE ORAL at 20:43

## 2020-12-11 ASSESSMENT — ACTIVITIES OF DAILY LIVING (ADL)
HYGIENE/GROOMING: INDEPENDENT
LAUNDRY: WITH SUPERVISION
LAUNDRY: WITH SUPERVISION
DRESS: INDEPENDENT;STREET CLOTHES
ORAL_HYGIENE: INDEPENDENT
ORAL_HYGIENE: INDEPENDENT
DRESS: INDEPENDENT
HYGIENE/GROOMING: INDEPENDENT

## 2020-12-11 ASSESSMENT — ANXIETY QUESTIONNAIRES
1. FEELING NERVOUS, ANXIOUS, OR ON EDGE: SEVERAL DAYS
3. WORRYING TOO MUCH ABOUT DIFFERENT THINGS: SEVERAL DAYS
7. FEELING AFRAID AS IF SOMETHING AWFUL MIGHT HAPPEN: SEVERAL DAYS
2. NOT BEING ABLE TO STOP OR CONTROL WORRYING: SEVERAL DAYS
GAD7 TOTAL SCORE: 9
4. TROUBLE RELAXING: MORE THAN HALF THE DAYS
IF YOU CHECKED OFF ANY PROBLEMS ON THIS QUESTIONNAIRE, HOW DIFFICULT HAVE THESE PROBLEMS MADE IT FOR YOU TO DO YOUR WORK, TAKE CARE OF THINGS AT HOME, OR GET ALONG WITH OTHER PEOPLE: VERY DIFFICULT
6. BECOMING EASILY ANNOYED OR IRRITABLE: MORE THAN HALF THE DAYS
5. BEING SO RESTLESS THAT IT IS HARD TO SIT STILL: SEVERAL DAYS

## 2020-12-11 ASSESSMENT — PATIENT HEALTH QUESTIONNAIRE - PHQ9: SUM OF ALL RESPONSES TO PHQ QUESTIONS 1-9: 15
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
## 2020-12-11 NOTE — ED NOTES
Patient aware that she cannot have her phone on the unit and that it is a locked unit. Patient understands and is okay with admission to detox

## 2020-12-11 NOTE — PLAN OF CARE
Behavioral Team Discussion: (12/11/2020)    Continued Stay Criteria/Rationale: Patient admitted for alcohol withdrawal, complicated.  Plan: The following services will be provided to the patient; psychiatric assessment, medication management, therapeutic milieu, individual and group support, and skills groups.   Participants: 3A Provider: Dr. Bhanu Alfred MD; 3A RN's: Carmen Núñez RN; 3A CM's: Karely Estes [facility]  Summary/Recommendation: Providers will assess today for treatment recommendations, discharge planning, and aftercare plans. CM will meet with pt for discharge planning.   Medical/Physical: Internal medicine consult to be completed 12/11/2020.  Precautions:   Behavioral Orders   Procedures     Code 1 - Restrict to Unit     Routine Programming     As clinically indicated     Status 15     Every 15 minutes.     Withdrawal precautions     Rationale for change in precautions or plan: N/A  Progress: No Change.

## 2020-12-11 NOTE — PROGRESS NOTES
Pt.was out and visible in the milieu. She ate and seemed to enjoy breakfast and lunch. She is still on MSSA with valium monitoring for alcohol withdrawal. Her MSSA score was 4. She denied all withdrawal symptoms and did not need valium. Denied any medication adverse side effects and none observed. Will continue to monitor.

## 2020-12-11 NOTE — CONSULTS
[facility]   Consult Note - Hospitalist Service     Date of Admission:  12/10/2020  Consult Requested by: Bhanu Alfred MC  Reason for Consult: General Medical Evaluation, Co-management of alcohol detox     Assessment & Plan   Jennifer Cantu is a 30 year old female with PMHx of Bipolar disorder, BPD, CHERRY, alcohol dependence, IBS, PCOS, Hx of bariatric surgery and splenectomy 2017, and Hx of vestibular migraine admitted on 12/10/2020 to inpatient psychiatry for alcohol detox.     #Alcohol dependence  #Alcohol withdrawal  Drinking 2 bottles of wine daily and occasional mimosas and bloody estephania. Denies any withdrawal seizures or DTs.   -Agree with MSSA protocol, management per psychiatry   -Agree with thiamine, folic acid and MVI    #Bipolar disorder  #Borderline personality disorder  #Anxiety  States she has been off of her medications since march.  - Management per psychiatry.     #GERD - Continue PTA omeprazole 40 mg daily.     #Leukocytosis, Resolved - Afebrile. 12.9 on admission, but normalized on repeat labs. Likely reactive leukocytosis.     #Episode of BRBPR- Patient endorsed an episode of painless BRBPR last week prior to admission. Denies any re-occurrence. Lab work with normal hemoglobin. No hypotension.  Could be due to hemorrhoids, but differential also includes diverticular bleed etc.   -Please notify medicine if patient develops further rectal bleeding  -Recommend follow up with PCP and/or GI for repeat colonoscopy    #Recent BV infection  #Recent vaginal candidiasis   #Recent Ureaplasma parvum infection   #Dysuria   Per care-everywhere, patient recent saw her PCP on 12/2 for dysuria symptoms. Work up negative for STI, and UTI, but wet prep was positive for BV and yeast. Patient was started on vaginal metronidazole and oral fluconazole, but states she can't remember if she completed the treatment or not. Per chart review, if patient was continuing to  have symptoms of dysuria, plan was to start doxycycline for positive Ureaplasma PCR. Continues to have dysuria.   -Repeat UA with reflex  -Repeat wet prep  -Will determine treatment once results return     Medicine will follow up on UA, and wet prep results. No further recommendations at this time. Please page the on-call YASHIRA for any intercurrent medical issues which arise.     Danyelle Littlejohn PA-C  Hospitalist Service  [phone]        Danyelle Littlejohn PA-C  [facility]     ______________________________________________________________________    Chief Complaint   General Medical Evaluation    History is obtained from the patient    History of Present Illness   Jennifer Cantu is a 30 year old female with PMHx of Bipolar disorder, BPD, CHERRY, alcohol dependence, IBS, PCOS, Hx of bariatric surgery and splenectomy 2017, and Hx of vestibular migraine admitted on 12/10/2020 to inpatient psychiatry for alcohol detox.     Patient reports drinking up to 2 bottles of champagne/wine daily, to the point of blacking out over the last several weeks. Denies any hx of DT or withdrawal seizures.     Patient endorsing chills overnight but denies any fevers.  Endorsing nausea, but denies any vomiting, or abdominal pain. Endorses chronic diarrhea, which she states has been worked up in the past with colonoscopy 5 years ago, with diagnosis of IBS. States she did have a day of BRBPR last week, where she filled the toilet with bright red blood, which was painless. States it resolved on its own and has not re-occurred. Has a hx on hemorrhoids. Had an episode of chest pain last night, sharp, radiating from substernal area to R shoulder, worse with movement and with deep breath, which resolved on its own over a few minutes. States this has happened in the past with her acid reflux. Denies any current chest pain, or SOB. Endorses dysuria, which started last week.  She saw her PCP who tested  her urine and for STI, was treated for BV last week, but not sure how many days left of topical treatment.       Review of Systems   The 10 point Review of Systems is negative other than noted in the HPI or here.     Past Medical History    I have reviewed this patient's medical history and updated it with pertinent information if needed.   Past Medical History:   Diagnosis Date     GI symptoms     PCP w/u pending. Diarrhea, constipation, abdominal cramping noted.      Substance abuse (H)     Alcohol       Past Surgical History   I have reviewed this patient's surgical history and updated it with pertinent information if needed.  Past Surgical History:   Procedure Laterality Date     CHOLECYSTECTOMY  2013     COLONOSCOPY  2015 2/2 chronic GI symptoms     GASTRECTOMY  07/04/2017       Social History   I have reviewed this patient's social history and updated it with pertinent information if needed.  Social History     Tobacco Use     Smoking status: Current Every Day Smoker     Packs/day: 0.50     Years: 10.00     Pack years: 5.00     Types: Cigarettes     Smokeless tobacco: Never Used   Substance Use Topics     Alcohol use: Yes     Alcohol/week: 0.0 standard drinks     Comment: drinks 2 bottle of champage or more a day, last drink 2 hours ago     Drug use: No       Family History   I have reviewed this patient's family history and updated it with pertinent information if needed.   Family History   Problem Relation Age of Onset     Cancer No family hx of        Medications   I have reviewed this patient's current medications  Current Facility-Administered Medications   Medication     acetaminophen (TYLENOL) tablet 650 mg     alum & mag hydroxide-simethicone (MAALOX) suspension 30 mL     atenolol (TENORMIN) tablet 50 mg     diazepam (VALIUM) tablet 5-20 mg     folic acid (FOLVITE) tablet 1 mg     hydrOXYzine (ATARAX) tablet 25 mg     loperamide (IMODIUM) capsule 2 mg     multivitamin w/minerals (THERA-VIT-M) tablet  1 tablet     nicotine (COMMIT) lozenge 2 mg     nicotine (NICODERM CQ) 21 MG/24HR 24 hr patch 1 patch     nicotine Patch in Place     omeprazole (priLOSEC) CR capsule 40 mg     ondansetron (ZOFRAN-ODT) ODT tab 4 mg     senna-docusate (SENOKOT-S/PERICOLACE) 8.6-50 MG per tablet 1 tablet     thiamine (B-1) tablet 100 mg     traZODone (DESYREL) tablet 50 mg     traZODone (DESYREL) tablet 50 mg       Allergies   Allergies   Allergen Reactions     Amoxicillin Unknown     Clindamycin Rash     Penicillins Rash       Physical Exam   Vital Signs: Temp: 97.6  F (36.4  C) Temp src: Temporal BP: 117/54 Pulse: 55   Resp: 16 SpO2: 98 % O2 Device: None (Room air)    Weight: 170 lbs 0 oz  GENERAL: Alert and oriented x 3. NAD. Pleasant and conversational   HEENT: Anicteric sclera. EOMI. Wearing face mask.   NECK: Trachea midline.   CARDIOVASCULAR: RRR. S1, S2. No murmurs, rubs, or gallops.   RESPIRATORY: Effort normal on RA. Clear to auscultation bilaterally, no rales, rhonchi or wheezes, respirations unlabored   GI: Abdomen soft, non-tender abdomen without rebound or guarding, normoactive bowel sounds present. Well healing tummy tuck incision.   MUSCULOSKELETAL: No joint swelling or tenderness. Moves all extremities.   EXTREMITIES: No peripheral edema. Intact bilateral pedal pulses. No calf asymmetry, erythema, or tenderness.   NEUROLOGICAL: No focal deficits. CN II-XII grossly intact. Moving all extremities symmetrically.   SKIN: Intact. Warm and dry. No rashes or lesions.  No jaundice.     Data     EKG: Poor quality EKG. NSR at 66 bpm. Normal axis. QTc 419. No ST elevations. Will repeat EKG.     Results for orders placed or performed during the hospital encounter of 12/10/20 (from the past 24 hour(s))   Alcohol breath test POCT   Result Value Ref Range    Alcohol Breath Test 0.00 0.00 - 0.01   Drug abuse screen 6 urine (tox)   Result Value Ref Range    Amphetamine Qual Urine Negative NEG^Negative    Barbiturates Qual Urine  Negative NEG^Negative    Benzodiazepine Qual Urine Negative NEG^Negative    Cannabinoids Qual Urine Positive (A) NEG^Negative    Cocaine Qual Urine Negative NEG^Negative    Ethanol Qual Urine Negative NEG^Negative    Opiates Qualitative Urine Negative NEG^Negative   HCG qualitative urine (UPT)   Result Value Ref Range    HCG Qual Urine Negative NEG^Negative   Asymptomatic Influenza A/B & SARS-CoV2 (COVID-19) Virus PCR Multiplex    Specimen: Nasopharyngeal   Result Value Ref Range    Flu A/B & SARS-COV-2 PCR Source Nasopharyngeal     SARS-CoV-2 PCR Result NEGATIVE     Influenza A PCR Negative NEG^Negative    Influenza B PCR Negative NEG^Negative    Respiratory Syncytial Virus PCR Negative NEG^Negative    Flu A/B & SARS-CoV-2 PCR Comment (Note)    CBC with platelets differential   Result Value Ref Range    WBC 12.9 (H) 4.0 - 11.0 10e9/L    RBC Count 4.22 3.8 - 5.2 10e12/L    Hemoglobin 13.7 11.7 - 15.7 g/dL    Hematocrit 41.4 35.0 - 47.0 %    MCV 98 78 - 100 fl    MCH 32.5 26.5 - 33.0 pg    MCHC 33.1 31.5 - 36.5 g/dL    RDW 12.8 10.0 - 15.0 %    Platelet Count 386 150 - 450 10e9/L    Diff Method Automated Method     % Neutrophils 68.0 %    % Lymphocytes 23.7 %    % Monocytes 7.0 %    % Eosinophils 0.5 %    % Basophils 0.5 %    % Immature Granulocytes 0.3 %    Nucleated RBCs 0 0 /100    Absolute Neutrophil 8.7 (H) 1.6 - 8.3 10e9/L    Absolute Lymphocytes 3.1 0.8 - 5.3 10e9/L    Absolute Monocytes 0.9 0.0 - 1.3 10e9/L    Absolute Eosinophils 0.1 0.0 - 0.7 10e9/L    Absolute Basophils 0.1 0.0 - 0.2 10e9/L    Abs Immature Granulocytes 0.0 0 - 0.4 10e9/L    Absolute Nucleated RBC 0.0    Comprehensive metabolic panel   Result Value Ref Range    Sodium 140 133 - 144 mmol/L    Potassium 4.6 3.4 - 5.3 mmol/L    Chloride 108 94 - 109 mmol/L    Carbon Dioxide 32 20 - 32 mmol/L    Anion Gap <1 (L) 3 - 14 mmol/L    Glucose 94 70 - 99 mg/dL    Urea Nitrogen 15 7 - 30 mg/dL    Creatinine 0.78 0.52 - 1.04 mg/dL    GFR Estimate >90  >60 mL/min/[1.73_m2]    GFR Estimate If Black >90 >60 mL/min/[1.73_m2]    Calcium 8.5 8.5 - 10.1 mg/dL    Bilirubin Total 0.4 0.2 - 1.3 mg/dL    Albumin 3.6 3.4 - 5.0 g/dL    Protein Total 6.9 6.8 - 8.8 g/dL    Alkaline Phosphatase 52 40 - 150 U/L    ALT 24 0 - 50 U/L    AST 14 0 - 45 U/L   Lipid panel   Result Value Ref Range    Cholesterol 151 <200 mg/dL    Triglycerides 86 <150 mg/dL    HDL Cholesterol 62 >49 mg/dL    LDL Cholesterol Calculated 72 <100 mg/dL    Non HDL Cholesterol 89 <130 mg/dL   TSH with free T4 reflex and/or T3 as indicated   Result Value Ref Range    TSH 2.27 0.40 - 4.00 mU/L   Folate   Result Value Ref Range    Folate 15.0 >5.4 ng/mL   GGT   Result Value Ref Range    GGT 17 0 - 40 U/L   CBC with platelets   Result Value Ref Range    WBC 8.9 4.0 - 11.0 10e9/L    RBC Count 4.41 3.8 - 5.2 10e12/L    Hemoglobin 14.2 11.7 - 15.7 g/dL    Hematocrit 43.2 35.0 - 47.0 %    MCV 98 78 - 100 fl    MCH 32.2 26.5 - 33.0 pg    MCHC 32.9 31.5 - 36.5 g/dL    RDW 12.3 10.0 - 15.0 %    Platelet Count 391 150 - 450 10e9/L

## 2020-12-11 NOTE — H&P
"[facility], [place]   Psychiatric History and Physical  Admission date: 12/10/2020        Chief Complaint:   Alcohol        HPI:     The patient is a 31yo female with a history of alcohol use disorder and depression who was admitted for detox after drinking up to 2 bottles of wine daily since April. Says that the withdrawal is \"pretty good so far.\" Some low appetite and poor sleep. Did have some possible VH of \"seeing writing on the ceiling\" but feels better. Mood is \"I don't feel anything.\" Discussed past medications and patient has been on Lamictal and Abilify as well as Wellbutrin (I didn't like it), Celexa (not sure if it worked) and Cymbalta (helpful). Does feel that she has bipolar disorder and has had manic episodes in the past where she went several days without sleeping. Denies any SI.      Per ER:  Jennifer Cantu is a 30 year old female with a past medical history significant for alcohol abuse, depression who presents here to the Emergency Department seeking detox from alcohol. Patient reports she drinks 2 bottles of champagne or wine daily, as well as mixed drinks with vodka.  She says he last drank last pm but then had one bloody landry this morning to feel better.  She tried going through home detox 2 weeks ago but was very sick with n/v,diarrhea and shakes. She cannot go through that again.  She is drinking lately and has been doing that for months.  She denies si/hi.  She denies known history of seizures with withdrawals. She has hx of BPD, bipolar, depression and anxiety and has been off of meds since this past March.  She says she uses thc a couple of times per week.         Past Psychiatric History:     History of SIB 10 years ago.   History of bipolar disorder  History of borderline PD  Hospitalized in 10/2016.         Substance Use and History:     Alcohol use disorder. Denies history of withdrawal seizures or DTs.   Cannabis abuse  Smokes 1/2 to 1 PPD        Past " "Medical History:   PAST MEDICAL HISTORY:   Past Medical History:   Diagnosis Date     GI symptoms     PCP w/u pending. Diarrhea, constipation, abdominal cramping noted.      Substance abuse (H)     Alcohol       PAST SURGICAL HISTORY:   Past Surgical History:   Procedure Laterality Date     CHOLECYSTECTOMY  2013     COLONOSCOPY  2015    2/2 chronic GI symptoms     GASTRECTOMY  07/04/2017             Family History:   FAMILY HISTORY: \"everyone\" has MI and CD issues  Family History   Problem Relation Age of Onset     Cancer No family hx of            Social History:   Please see the full psychosocial profile from the clinical treatment coordinator.   SOCIAL HISTORY:   Social History     Tobacco Use     Smoking status: Current Every Day Smoker     Packs/day: 0.50     Years: 10.00     Pack years: 5.00     Types: Cigarettes     Smokeless tobacco: Never Used   Substance Use Topics     Alcohol use: Yes     Alcohol/week: 0.0 standard drinks     Comment: drinks 2 bottle of champage or more a day, last drink 2 hours ago            Physical ROS:   The patient endorsed low appetite. The remainder of 10-point review of systems was negative except as noted in HPI.         PTA Medications:     Medications Prior to Admission   Medication Sig Dispense Refill Last Dose     omeprazole (PRILOSEC) 20 MG DR capsule Take 40 mg by mouth daily   12/10/2020 at 0700          Allergies:     Allergies   Allergen Reactions     Amoxicillin Unknown     Clindamycin Rash     Penicillins Rash          Labs:     Recent Results (from the past 48 hour(s))   Alcohol breath test POCT    Collection Time: 12/10/20 12:34 PM   Result Value Ref Range    Alcohol Breath Test 0.00 0.00 - 0.01   Drug abuse screen 6 urine (tox)    Collection Time: 12/10/20  1:37 PM   Result Value Ref Range    Amphetamine Qual Urine Negative NEG^Negative    Barbiturates Qual Urine Negative NEG^Negative    Benzodiazepine Qual Urine Negative NEG^Negative    Cannabinoids Qual Urine " Positive (A) NEG^Negative    Cocaine Qual Urine Negative NEG^Negative    Ethanol Qual Urine Negative NEG^Negative    Opiates Qualitative Urine Negative NEG^Negative   HCG qualitative urine (UPT)    Collection Time: 12/10/20  1:37 PM   Result Value Ref Range    HCG Qual Urine Negative NEG^Negative   Asymptomatic Influenza A/B & SARS-CoV2 (COVID-19) Virus PCR Multiplex    Collection Time: 12/10/20  2:06 PM    Specimen: Nasopharyngeal   Result Value Ref Range    Flu A/B & SARS-COV-2 PCR Source Nasopharyngeal     SARS-CoV-2 PCR Result NEGATIVE     Influenza A PCR Negative NEG^Negative    Influenza B PCR Negative NEG^Negative    Respiratory Syncytial Virus PCR Negative NEG^Negative    Flu A/B & SARS-CoV-2 PCR Comment (Note)    CBC with platelets differential    Collection Time: 12/10/20  2:19 PM   Result Value Ref Range    WBC 12.9 (H) 4.0 - 11.0 10e9/L    RBC Count 4.22 3.8 - 5.2 10e12/L    Hemoglobin 13.7 11.7 - 15.7 g/dL    Hematocrit 41.4 35.0 - 47.0 %    MCV 98 78 - 100 fl    MCH 32.5 26.5 - 33.0 pg    MCHC 33.1 31.5 - 36.5 g/dL    RDW 12.8 10.0 - 15.0 %    Platelet Count 386 150 - 450 10e9/L    Diff Method Automated Method     % Neutrophils 68.0 %    % Lymphocytes 23.7 %    % Monocytes 7.0 %    % Eosinophils 0.5 %    % Basophils 0.5 %    % Immature Granulocytes 0.3 %    Nucleated RBCs 0 0 /100    Absolute Neutrophil 8.7 (H) 1.6 - 8.3 10e9/L    Absolute Lymphocytes 3.1 0.8 - 5.3 10e9/L    Absolute Monocytes 0.9 0.0 - 1.3 10e9/L    Absolute Eosinophils 0.1 0.0 - 0.7 10e9/L    Absolute Basophils 0.1 0.0 - 0.2 10e9/L    Abs Immature Granulocytes 0.0 0 - 0.4 10e9/L    Absolute Nucleated RBC 0.0    Comprehensive metabolic panel    Collection Time: 12/10/20  2:19 PM   Result Value Ref Range    Sodium 140 133 - 144 mmol/L    Potassium 4.6 3.4 - 5.3 mmol/L    Chloride 108 94 - 109 mmol/L    Carbon Dioxide 32 20 - 32 mmol/L    Anion Gap <1 (L) 3 - 14 mmol/L    Glucose 94 70 - 99 mg/dL    Urea Nitrogen 15 7 - 30 mg/dL     "Creatinine 0.78 0.52 - 1.04 mg/dL    GFR Estimate >90 >60 mL/min/[1.73_m2]    GFR Estimate If Black >90 >60 mL/min/[1.73_m2]    Calcium 8.5 8.5 - 10.1 mg/dL    Bilirubin Total 0.4 0.2 - 1.3 mg/dL    Albumin 3.6 3.4 - 5.0 g/dL    Protein Total 6.9 6.8 - 8.8 g/dL    Alkaline Phosphatase 52 40 - 150 U/L    ALT 24 0 - 50 U/L    AST 14 0 - 45 U/L          Physical and Psychiatric Examination:     /65   Pulse 69   Temp 97.1  F (36.2  C) (Temporal)   Resp 16   Ht 1.727 m (5' 8\")   Wt 77.1 kg (170 lb)   SpO2 100%   BMI 25.85 kg/m    Weight is 170 lbs 0 oz  Body mass index is 25.85 kg/m .    Physical Exam:  I have reviewed the physical exam as documented by by the medical team and agree with findings and assessment and have no additional findings to add at this time.    Mental Status Exam:  Appearance: awake, alert and adequately groomed  Attitude:  cooperative  Eye Contact:  good  Mood:  \"I don't feel anything\"  Affect:  intensity is blunted  Speech:  clear, coherent  Language: fluent and intact in English  Psychomotor, Gait, Musculoskeletal:  no evidence of tardive dyskinesia, dystonia, or tics  Thought Process:  goal oriented  Associations:  no loose associations  Thought Content:  no evidence of suicidal ideation or homicidal ideation and visual hallucinations present  Insight:  fair  Judgement:  intact  Oriented to:  time, person, and place  Attention Span and Concentration:  intact  Recent and Remote Memory:  fair  Fund of Knowledge:  appropriate         Admission Diagnoses:      Alcohol withdrawal with unspecified complication   History of bipolar disorder  History of borderline personality disorder         Assessment & Plan:     1) Continue MSSA protocol.   2) Referred patient to MICD program.   3) Will discuss options for antidepressants or mood stabilizers after detox.     Disposition Plan   Reason for ongoing admission: requires detoxification from substance that poses a risk of bodily harm during " withdrawal period  Discharge location: home with family  Discharge Medications: not ordered  Follow-up Appointments: not scheduled  Legal Status: voluntary    Video-Visit Details    Type of service:  Video Visit    Video Start Time (time video started): 1235    Video End Time (time video stopped): 1245    Originating Location (pt. Location): Other Station 3A    Distant Location (provider location): Provider remote location    Mode of Communication:  Video Conference via [employer]    Physician has received verbal consent for a Video Visit from the patient? Yes    Entered by: Bhanu Alfred on 12/11/2020 at 6:01 AM

## 2020-12-11 NOTE — PLAN OF CARE
"RN ADMIT  Patient voluntarily admitted to  for alcohol withdrawal arrive to unit at 1900.  Patient reports she has been drinking two bottles of wine and/or mixed drinks daily since April 2020; She has not been able to stop drinking on her own and experiences nausea, cold sweat, panic when attempt to quit on own at home;   Patient cooperative with search, ate dinner and met with writer to complete admission process; patient denies any Suicidal ideation or thoughts of not wanting to live; patient reports history of cutting 10 years ago, no current self injurious thoughts or SIB; patient reports poor sleep. Patient reports last CD Treatment 30 day program 2018, [facility] in [facility] following her second DUI, prior to April 2020, patient has not been able to remain sober for longer than 4 months.  Marijuana use weekly, tobacco cigarettes 1-1/2 PPD.  Hx of Bipolar, BPD, Anxiety and Depression.   Past Medical History includes, Vestibular Migraines, Bariatric surgery and Splenectomy in 2017, Aug 2020 Cosmetic surgery of Tummy Tuck and Breast Implants; PTA medicine of Protonix continued on admission. Patient denies any history of Withdrawal seizures or D/T's    DX: Marijuana abuse [F12.10]  Alcohol dependence with uncomplicated withdrawal (H) [F10.230]     Vital signs reviewed related to admission.  /75   Pulse 76   Temp 98.3  F (36.8  C) (Temporal)   Resp 16   Ht 1.727 m (5' 8\")   Wt 77.1 kg (170 lb)   SpO2 100%   BMI 25.85 kg/m  .           Patient stated GOALS for Discharge: \"is to get help and stop drinking.\"     Covid and Hcg Negative.    PROVIDER: Aubrie o/c provider consulted for admission orders/TORB and placed. MSSA scale with valium, nicotine patch and lozenge given.    MSSA:4 at 2000 (received valium 5 mg in ED)     Nursing will continue to monitor,   "

## 2020-12-11 NOTE — PROGRESS NOTES
DA Consult completed.  Patient is recommended DDP and is agreeable. I discussed recommendation with CTC and provided group info to be included in AVS.    Start date:  12/15/20, Group days and time: M-F 11am-2pm    Dual Disorder Program#- [phone]      [facility]  [facility]  Phone: [phone]

## 2020-12-11 NOTE — PROGRESS NOTES
PDMP as of 12/11/2020:     Jennifer Cantu   Risk Indicators   NARX SCORES  Narcotic  200  Sedative  100  Stimulant  000  Explanation and Guidance  OVERDOSE RISK SCORE     450    (Range 000-999)  Explanation and Guidance  ADDITIONAL RISK INDICATORS ( 0 )     Explanation and Guidance   This [facility] report is based on search criteria supplied and the data entered by the dispensing pharmacy. For more information about any prescription, please contact the dispensing pharmacy or the prescriber. NarxCare scores and reports are intended to aid, not replace, medical decision making. None of the information presented should be used as sole justification for providing or refusing to provide medications. The information on this report is not warranted as accurate or complete.   Graphs   RX GRAPH  Narcotic Buprenorphine Sedative Stimulant Other     All Prescribers    Prescribers    2 - MOMO Palacios    1 - Eun Aguayo,    Timeline  12/11  2m  6m  1y  2y    Buprenorphine mg    16    4    0  28    Timeline  12/11  2m  6m  1y  2y  Morphine MgEq (MME)    200    80    0  320    Timeline  12/11  2m  6m  1y  2y    Lorazepam MgEq (LME)    10    2    0  18    Timeline  12/11  2m  6m  1y  2y  *Per CDC guidance, the MME conversion factors prescribed or provided as part of the medication-assisted treatment for opioid use disorder should not be used to benchmark against dosage thresholds meant for opioids prescribed for pain. Buprenorphine products have no agreed upon morphine equivalency, and as partial opioid agonists, are not expected to be associated with overdose risk in the same dose-dependent manner as doses for full agonist opioids. MME = morphine milligram equivalents. LME = Lorazepam milligram equivalents. mg = dose in milligrams.     Summary     Summary  Total Prescriptions:     3   Total Prescribers:     2   Total Pharmacies:     3   Narcotics* (excluding Buprenorphine)  Current Qty:     0   Current MME/day:     0.00  30  Day Avg MME/day:     0.00   Sedatives*  Current Qty:     0   Current LME/day:    0.00  30 Day Avg LME/day:    0.00  Buprenorphine*  Current Qty:     0   Current mg/day:    0.00  30 Day Avg mg/day:    0.00  Rx Data   PRESCRIPTIONS  Total Prescriptions:  3  Total Private Pay:  0  Fill Date  ID  Written  Sold  Drug  Qty  Days  Prescriber  Rx #  Pharmacy  Refill  Daily Dose *  Pymt Type    08/20/2020   2   08/18/2020     Oxycodone-Acetaminophen 5-325   20.00  4  Ma Cam   [id number]   Gra ([id number])   0/0  37.50 MME  [place]  08/19/2020   1   08/19/2020 08/19/2020   Oxycodone Hcl 5 Mg Tablet   10.00  2   Eps   [id number]   Wal (0700)   0/0  37.50 M[place]  08/12/2020   1   08/12/2020 08/12/2020   Oxycodone-Acetaminophen 5-325   30.00  5  Ma Cam   [id number]   Wal (7982)   0/0  45.00 MME  [place]  *Per CDC guidance, the MME conversion factors prescribed or provided as part of the medication-assisted treatment for opioid use disorder should not be used to benchmark against dosage thresholds meant for opioids prescribed for pain. Buprenorphine products have no agreed upon morphine equivalency, and as partial opioid agonists, are not expected to be associated with overdose risk in the same dose-dependent manner as doses for full agonist opioids. MME = morphine milligram equivalents. LME = Lorazepam milligram equivalents. mg = dose in milligrams.     Providers  Total Providers: 2   Name   Address   City   State   Zipcode   Phone   Eun Aguayo Pa-C  [address]   [facility]  [address] [phone]  Isaias Lindquist MD  [address] [facility] [address]   [facility]  [address] [phone]  Pharmacies  Total Pharmacies: 3   Name   Address   City   State   Zipcode   Phone   [employer] [phone] [facility]  [address] [phone]  [employer] [phone] [facility]  [address] [phone]  [facility] [phone] [facility]  [facility]  [address]  [phone]    The report provided is based upon the search criteria entered and the corresponding data as it has been reported by dispenser(s). If erroneous information is identified or additional information is needed, please contact the dispenser or the prescriber provided on the report. Date Sold signifies the date the prescription was sold (left the pharmacy). The absence of Date Sold does not necessarily indicate the prescription was not dispensed. Fill Date represents the date the medication was filled or prepared by the pharmacy. Note, federal regulation (CFR Title 42: Part 2) requires patient consent prior to releasing certain patient data from federally funded opioid treatment programs (OTPs). As such, controlled substances dispensed from OTPs for medication-assisted treatment may not appear in the [place]  report. Morphine milligram equivalent (MME) conversion factors published by the CDC are used in the MME calculation. Per the CDC, the MME conversion factor is intended only for analytic purposes where prescription data are used to retrospectively calculate daily MME to inform analyses of risks associated with opioid prescribing. This value does not constitute clinical guidance or recommendations for converting patients from one form of opioid analgesic to another. Per the CDC, the conversion factors for drugs prescribed or provided, as part of medication-assisted treatment for opioid use disorder should not be used to benchmark against MME dosage thresholds meant for opioids prescribed for pain. Buprenorphine products listed in the CDC s MME file do not have an associated conversion factor. Lastly, the CDC notes, in clinical practice, calculating MME for methadone often involves a sliding-scale approach, whereby the conversion factor increases with increasing dose. The conversion factor of 3 for methadone presented in this file could underestimate MME for a given patient.  This report contains confidential information, including patient identifiers, and is not a public record. The information on this report must be treated as protected health information and is only to be disclosed to others as authorized by applicable state and Federal regulations.           Powered By       [place] Prescription Monitoring Program  [place] Board of [employer]acy  [address] [place] [address] [address]  [place] [address]  5 [phone]     [employer] 2020. All Rights Reserved. Privacy Policy

## 2020-12-11 NOTE — PROGRESS NOTES
Case Management Note  12/11/2020    Met with pt to discuss discharge planning. Pt reports a plan to return home, is interested in some form of virtual IOP. Pt is in the process of buying a home with her boyfriend and moving so does not feel inpatient would be possible. Pt reports comorbid diagnoses of bipolar disorder and generalized anxiety disorder, reports her psychiatrist also suspects borderline personality disorder. Pt completed 8 months of a 12-month DBT program through [facility], discharged in July 2020 due to missing 4 consecutive sessions. Pt reports she struggled to keep up with work, the program, and helping her daughter with online school. Talked with pt about dual disorders program. Pt interested in this. Pt works [employer] and feels she can make the schedule work. Will request diagnostic eval referral.     Addendum: Pt completed dual diagnosis eval with KIMI Wiggins. Pt to start DDP on Tuesday, 12/15/20, in 11a-2p track. AVS updated.     Karely Estes, LPCC, LADC

## 2020-12-11 NOTE — PROGRESS NOTES
12/10/20 2000   Patient Belongings   Did you bring any home meds/supplements to the hospital?  Yes   Disposition of meds  Other (see comment)  (medroom)   Patient Belongings other (see comments)  (storage bin, medroom bin, security)   Belongings Search Yes   Clothing Search Yes   Second Staff Gris   Storage bin: back pack, laptop computer, jacket, silver and copper colored necklace, tote bag, purse, key, cigarettes, lighter  Medroom bin: cell phone, billfold, meds from home ([id number])  Security [id number]: $3558 cash, 5 visa, social security card, 2 care credit cards  A               Admission:  I am responsible for any personal items that are not sent to the safe or pharmacy.  [facility] is not responsible for loss, theft or damage of any property in my possession.    Signature:  _________________________________ Date: _______  Time: _____                                              Staff Signature:  ____________________________ Date: ________  Time: _____      2nd Staff person, if patient is unable/unwilling to sign:    Signature: ________________________________ Date: ________  Time: _____     Discharge:  [facility] has returned all of my personal belongings:    Signature: _________________________________ Date: ________  Time: _____                                          Staff Signature:  ____________________________ Date: ________  Time: _____

## 2020-12-12 VITALS
HEIGHT: 68 IN | OXYGEN SATURATION: 100 % | TEMPERATURE: 98.3 F | BODY MASS INDEX: 25.76 KG/M2 | DIASTOLIC BLOOD PRESSURE: 87 MMHG | HEART RATE: 58 BPM | RESPIRATION RATE: 16 BRPM | WEIGHT: 170 LBS | SYSTOLIC BLOOD PRESSURE: 134 MMHG

## 2020-12-12 PROCEDURE — 250N000013 HC RX MED GY IP 250 OP 250 PS 637: Performed by: NURSE PRACTITIONER

## 2020-12-12 PROCEDURE — 99239 HOSP IP/OBS DSCHRG MGMT >30: CPT | Mod: 95 | Performed by: PSYCHIATRY & NEUROLOGY

## 2020-12-12 RX ORDER — TRAZODONE HYDROCHLORIDE 50 MG/1
50 TABLET, FILM COATED ORAL
Qty: 30 TABLET | Refills: 1 | Status: SHIPPED | OUTPATIENT
Start: 2020-12-12

## 2020-12-12 RX ORDER — FOLIC ACID 1 MG/1
1 TABLET ORAL DAILY
Qty: 30 TABLET | Refills: 1 | Status: SHIPPED | OUTPATIENT
Start: 2020-12-12

## 2020-12-12 RX ORDER — MULTIPLE VITAMINS W/ MINERALS TAB 9MG-400MCG
1 TAB ORAL DAILY
Qty: 30 TABLET | Refills: 1 | Status: SHIPPED | OUTPATIENT
Start: 2020-12-12

## 2020-12-12 RX ORDER — HYDROXYZINE HYDROCHLORIDE 25 MG/1
25 TABLET, FILM COATED ORAL EVERY 4 HOURS PRN
Qty: 60 TABLET | Refills: 1 | Status: SHIPPED | OUTPATIENT
Start: 2020-12-12

## 2020-12-12 RX ORDER — NALTREXONE HYDROCHLORIDE 50 MG/1
50 TABLET, FILM COATED ORAL DAILY
Qty: 30 TABLET | Refills: 0 | Status: SHIPPED | OUTPATIENT
Start: 2020-12-12

## 2020-12-12 RX ADMIN — Medication 100 MG: at 09:07

## 2020-12-12 RX ADMIN — OMEPRAZOLE 40 MG: 20 CAPSULE, DELAYED RELEASE ORAL at 09:07

## 2020-12-12 RX ADMIN — NICOTINE 1 PATCH: 21 PATCH, EXTENDED RELEASE TRANSDERMAL at 09:07

## 2020-12-12 RX ADMIN — FOLIC ACID 1 MG: 1 TABLET ORAL at 09:07

## 2020-12-12 RX ADMIN — MULTIPLE VITAMINS W/ MINERALS TAB 1 TABLET: TAB at 09:07

## 2020-12-12 ASSESSMENT — ANXIETY QUESTIONNAIRES: GAD7 TOTAL SCORE: 9

## 2020-12-12 NOTE — DISCHARGE SUMMARY
[facility]  Department of Psychiatry    DATE OF ADMISSION:  12/10/2020    DATE OF DISCHARGE:  December 12, 2020    DISCHARGE DIAGNOSES:   Alcohol use disorder, severe  Alcohol withdrawal, resolved  Cannabis use disorder, mild  Bipolar disorder type I  Borderline personality disorder    HOSPITAL COURSE: (Refer to H&P, progress notes, and consult notes for details)    The patient was admitted to unit 3a to detox from alcohol voluntarily.  [facility] protocol was utilized for management of alcohol withdrawal symptoms and the patient completed detox without complications.  Her mood remained stable throughout her hospital stay.  Her prior to admission medications were resumed for mood and anxiety management.  Upon completing detox from alcohol, she requested to be discharged home.  At the time of her request for discharge, I was providing on-call coverage and met with the patient to ensure appropriateness for discharge.  The patient reported maintaining mood stability and denied suicidal and homicidal thoughts.  She was content with her plan of care and verbalized her desire to maintain sobriety.  She requested to resume naltrexone to assist in maintaining sobriety.  She recalled maintaining meaningful sobriety in the past on the Vivitrol injection and was hopeful to pursue that option again with her outpatient providers.  The patient met with our  to assist in optimizing her outpatient care plan.  Her care was then transitioned to the outpatient setting.    CONDITION AT DISCHARGE:  Improved.  The patients acute suicide risk is low due to the following factors:  improved mood/anxiety symptoms.  Denies suicidal ideations. Denies psychotic symptoms.  Not actively intoxicated and plans to abstain from illicit substances and alcohol.  Denies access to guns.  Denies feeling hopeless or helpless. At the time of discharge Jennifer Cantu was determined to not be an immediate danger  to herself or others. The patient's acute risk will be higher if noncompliant with treatment plan, medications, follow-up or using illicit substances or alcohol.  These findings along with the risks of noncompliance with medications and treatment plan, which could potentially cause decompensation and increase the risk for suicide, were discussed with the patient.  The patients chronic suicide risk is moderate given the following facto white race; diagnosis of Bipolar disorder, Borderline Personality disorder; history of SIB; history of chemical dependency; Denied a family history of suicide.  Preventative factors include: social supports, stable housing     MENTAL STATUS EXAMINATION AT TIME OF DISCHARGE:  The patient is 30 year old White female who appears their stated age and is appropriately dressed with good hygiene.  Calm and cooperative with the interview questions.  No psychomotor abnormalities are noted. Eye contact is appropriate. Speech has normal rate, tone, latency and volume and is not pushed or pressured. Mood is euthymic and affect is full and appropriate.  The patient does not seem overtly depressed, anxious, manic or irritable.  Thought process is linear, logical and future oriented.  Thought process is not tangential, circumstantial or disorganized.  Thought content is not significant for apperant paranoia, delusions, ideas of reference or grandiosity.  The patient denies suicidal and homicidal ideations as well as auditory and visual hallucinations.  Insight and judgment are fair.  Cognition appears intact to interviewing including orientation, recent and remote memory, fund of knowledge, use of language, attention span and concentration.  Muscle strength, tone and gait appear normal on visual inspection.      DISPOSITION:  The patient is discharged home     FOLLOWUP APPOINTMENTS:  ( per social workers notes and after visit summary)  [facility] Dual Disorders Program  You have been recommended  "to attend the outpatient dual disorders program (AKA MICD program) here at [facility].  Start date/time: Tuesday, 12/15/20 @ 11 AM  *This program is currently being offered via telehealth due to COVID-19. Please work with the program directly to set this up prior to your start date.*  Group schedule: M-F 11am-2pm  Dual Disorder Program Phone: [phone]  About the program: \"This day treatment program provides a safe and supportive environment to address the complexities of both mental health symptoms and problematic substance abuse. Staff provide an integrated approach and meet you at your own stage of change. This abstinence-based program supports long-term stability and recovery. Groups meet Monday through Friday for three hours each day for about six-to-eight weeks. Services also include regular follow-up with a physician who specializes in mental health and addiction medicine.\"     Please follow up with your primary care provider within 30 days of discharge.         DISCHARGE MEDICATIONS:   Current Discharge Medication List      START taking these medications    Details   folic acid (FOLVITE) 1 MG tablet Take 1 tablet (1 mg) by mouth daily  Qty: 30 tablet, Refills: 1    Associated Diagnoses: Alcohol dependence with uncomplicated withdrawal (H)      hydrOXYzine (ATARAX) 25 MG tablet Take 1 tablet (25 mg) by mouth every 4 hours as needed for anxiety  Qty: 60 tablet, Refills: 1    Associated Diagnoses: Alcohol dependence with uncomplicated withdrawal (H)      multivitamin w/minerals (THERA-VIT-M) tablet Take 1 tablet by mouth daily  Qty: 30 tablet, Refills: 1    Associated Diagnoses: Alcohol dependence with uncomplicated withdrawal (H)      naltrexone (DEPADE/REVIA) 50 MG tablet Take 1 tablet (50 mg) by mouth daily  Qty: 30 tablet, Refills: 0    Associated Diagnoses: Alcohol dependence with uncomplicated withdrawal (H)      traZODone (DESYREL) 50 MG tablet Take 1 tablet (50 mg) by mouth nightly as " needed for sleep (May repeat after 60 minutes.)  Qty: 30 tablet, Refills: 1    Associated Diagnoses: Alcohol dependence with uncomplicated withdrawal (H)         CONTINUE these medications which have NOT CHANGED    Details   omeprazole (PRILOSEC) 20 MG DR capsule Take 40 mg by mouth daily              LABORATORY RESULTS: (past 14 days)  Recent Results (from the past 336 hour(s))   Alcohol breath test POCT    Collection Time: 12/10/20 12:34 PM   Result Value Ref Range    Alcohol Breath Test 0.00 0.00 - 0.01   Drug abuse screen 6 urine (tox)    Collection Time: 12/10/20  1:37 PM   Result Value Ref Range    Amphetamine Qual Urine Negative NEG^Negative    Barbiturates Qual Urine Negative NEG^Negative    Benzodiazepine Qual Urine Negative NEG^Negative    Cannabinoids Qual Urine Positive (A) NEG^Negative    Cocaine Qual Urine Negative NEG^Negative    Ethanol Qual Urine Negative NEG^Negative    Opiates Qualitative Urine Negative NEG^Negative   HCG qualitative urine (UPT)    Collection Time: 12/10/20  1:37 PM   Result Value Ref Range    HCG Qual Urine Negative NEG^Negative   Asymptomatic Influenza A/B & SARS-CoV2 (COVID-19) Virus PCR Multiplex    Collection Time: 12/10/20  2:06 PM    Specimen: Nasopharyngeal   Result Value Ref Range    Flu A/B & SARS-COV-2 PCR Source Nasopharyngeal     SARS-CoV-2 PCR Result NEGATIVE     Influenza A PCR Negative NEG^Negative    Influenza B PCR Negative NEG^Negative    Respiratory Syncytial Virus PCR Negative NEG^Negative    Flu A/B & SARS-CoV-2 PCR Comment (Note)    CBC with platelets differential    Collection Time: 12/10/20  2:19 PM   Result Value Ref Range    WBC 12.9 (H) 4.0 - 11.0 10e9/L    RBC Count 4.22 3.8 - 5.2 10e12/L    Hemoglobin 13.7 11.7 - 15.7 g/dL    Hematocrit 41.4 35.0 - 47.0 %    MCV 98 78 - 100 fl    MCH 32.5 26.5 - 33.0 pg    MCHC 33.1 31.5 - 36.5 g/dL    RDW 12.8 10.0 - 15.0 %    Platelet Count 386 150 - 450 10e9/L    Diff Method Automated Method     % Neutrophils 68.0  %    % Lymphocytes 23.7 %    % Monocytes 7.0 %    % Eosinophils 0.5 %    % Basophils 0.5 %    % Immature Granulocytes 0.3 %    Nucleated RBCs 0 0 /100    Absolute Neutrophil 8.7 (H) 1.6 - 8.3 10e9/L    Absolute Lymphocytes 3.1 0.8 - 5.3 10e9/L    Absolute Monocytes 0.9 0.0 - 1.3 10e9/L    Absolute Eosinophils 0.1 0.0 - 0.7 10e9/L    Absolute Basophils 0.1 0.0 - 0.2 10e9/L    Abs Immature Granulocytes 0.0 0 - 0.4 10e9/L    Absolute Nucleated RBC 0.0    Comprehensive metabolic panel    Collection Time: 12/10/20  2:19 PM   Result Value Ref Range    Sodium 140 133 - 144 mmol/L    Potassium 4.6 3.4 - 5.3 mmol/L    Chloride 108 94 - 109 mmol/L    Carbon Dioxide 32 20 - 32 mmol/L    Anion Gap <1 (L) 3 - 14 mmol/L    Glucose 94 70 - 99 mg/dL    Urea Nitrogen 15 7 - 30 mg/dL    Creatinine 0.78 0.52 - 1.04 mg/dL    GFR Estimate >90 >60 mL/min/[1.73_m2]    GFR Estimate If Black >90 >60 mL/min/[1.73_m2]    Calcium 8.5 8.5 - 10.1 mg/dL    Bilirubin Total 0.4 0.2 - 1.3 mg/dL    Albumin 3.6 3.4 - 5.0 g/dL    Protein Total 6.9 6.8 - 8.8 g/dL    Alkaline Phosphatase 52 40 - 150 U/L    ALT 24 0 - 50 U/L    AST 14 0 - 45 U/L   Lipid panel    Collection Time: 12/11/20  6:52 AM   Result Value Ref Range    Cholesterol 151 <200 mg/dL    Triglycerides 86 <150 mg/dL    HDL Cholesterol 62 >49 mg/dL    LDL Cholesterol Calculated 72 <100 mg/dL    Non HDL Cholesterol 89 <130 mg/dL   TSH with free T4 reflex and/or T3 as indicated    Collection Time: 12/11/20  6:52 AM   Result Value Ref Range    TSH 2.27 0.40 - 4.00 mU/L   Folate    Collection Time: 12/11/20  6:52 AM   Result Value Ref Range    Folate 15.0 >5.4 ng/mL   GGT    Collection Time: 12/11/20  6:52 AM   Result Value Ref Range    GGT 17 0 - 40 U/L   CBC with platelets    Collection Time: 12/11/20  8:43 AM   Result Value Ref Range    WBC 8.9 4.0 - 11.0 10e9/L    RBC Count 4.41 3.8 - 5.2 10e12/L    Hemoglobin 14.2 11.7 - 15.7 g/dL    Hematocrit 43.2 35.0 - 47.0 %    MCV 98 78 - 100 fl     MCH 32.2 26.5 - 33.0 pg    MCHC 32.9 31.5 - 36.5 g/dL    RDW 12.3 10.0 - 15.0 %    Platelet Count 391 150 - 450 10e9/L   EKG 12-lead, complete    Collection Time: 12/11/20  9:42 AM   Result Value Ref Range    Interpretation ECG Click View Image link to view waveform and result    EKG 12-lead, complete    Collection Time: 12/11/20  9:42 AM   Result Value Ref Range    Interpretation ECG Click View Image link to view waveform and result    EKG 12-lead, complete    Collection Time: 12/11/20  5:37 PM   Result Value Ref Range    Interpretation ECG Click View Image link to view waveform and result    UA with Microscopic reflex to Culture    Collection Time: 12/11/20  6:25 PM    Specimen: Urine Midstream; Midstream Urine   Result Value Ref Range    Color Urine Yellow     Appearance Urine Clear     Glucose Urine Negative NEG^Negative mg/dL    Bilirubin Urine Negative NEG^Negative    Ketones Urine Negative NEG^Negative mg/dL    Specific Gravity Urine 1.019 1.003 - 1.035    Blood Urine Negative NEG^Negative    pH Urine 6.5 5.0 - 7.0 pH    Protein Albumin Urine Negative NEG^Negative mg/dL    Urobilinogen mg/dL 2.0 0.0 - 2.0 mg/dL    Nitrite Urine Negative NEG^Negative    Leukocyte Esterase Urine Negative NEG^Negative    Source Midstream Urine     WBC Urine <1 0 - 5 /HPF    RBC Urine <1 0 - 2 /HPF    Squamous Epithelial /HPF Urine 1 0 - 1 /HPF   Wet prep    Collection Time: 12/11/20  6:25 PM    Specimen: Vagina   Result Value Ref Range    Specimen Description Vagina     Wet Prep Rare  PMNs seen       Wet Prep No clue cells seen     Wet Prep No yeast seen     Wet Prep No motile Trichomonas seen        >30 minutes was spent on this discharge to allow for reviewing the patient's response to treatment, reviewing plan of care, education on medications and diagnosis, and conducting a risk assessment.    Visit/Communication Style   VIRTUAL (VIDEO) communication was used to evaluate Jennifer due to the COVID pandemic.    Jennifer consented to  the use of video communication: yes  Video START time: 10:17 AM, 12/12/2020  Video STOP time: 10:23 AM, 12/12/2020   Patient's location: [facility]    Provider's location during the visit: Home

## 2020-12-12 NOTE — PLAN OF CARE
"S:  Jennifer has been visible in the milieu, she remained isolative to herself, though.  She has participated in AA via [facility].  She is eating and drinking normally.  She denies any thoughts of self harm, suicide or thoughts of harming others.  She denies anxiety, but endorses depression, rated at a \"4\".  When asked about her mood she replied, \"Nonexistent\".  She has not required any medication for alcohol withdrawal for more than 24 hours.  She provided a urine specimen and a wet prep which were sent to lab.  She complained of a headache and was medicated at 19:00.      B: Pt admitted for alcohol/benzodiazepine (BZ) withdrawal and detoxification.  A:  Pt medically stable in the detox process AEB MSSA scores of 4 & 4.  R:  Pt removed from the withdrawal monitoring process (taken OOD).  Tylenol 650 mg was administeredContinue to monitor VS and prepare for discharge  "

## 2020-12-12 NOTE — PLAN OF CARE
Pt is being discharged to home with plan to follow up with day treatment program. Pt will be picked up by cousin. AVS and labs printed and reviewed with patient, all questions answered and copies sent with pt. Patient discharged with all medications and belongings.

## 2020-12-12 NOTE — PROGRESS NOTES
Medicine following UA and wet prep.    Both negative for infection.     Encourage fluids. 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 Plan was to treat BV and yeast first, then if continued dysuria, treat with doxy 100 mg BID x1 week.     Valeria Delacruz PA-C

## 2020-12-12 NOTE — DISCHARGE INSTRUCTIONS
Behavioral Discharge Planning and Instructions  THANK YOU FOR CHOOSING [facility]  3AW  [phone]    Summary: You were admitted to [facility] on 12/10/2020 for detoxification from alcohol.  A medical exam was performed that included lab work. You have met with a  and opted to attend the [facility] Dual Disorders Program.  Please take care and make your recovery a daily priority, Jennifer! It was a pleasure working with you and the entire treatment team here wishes you the very best in your recovery!     Recommendation:  [facility] and follow all recommendations of your treatment providers.      Main Diagnoses:  Per Dr. Aubrie MD:   Alcohol withdrawal with unspecified complication   History of bipolar disorder  History of borderline personality disorder    Major Treatments, Procedures and Findings:  You were treated for alcohol detoxification using [facility] protocol.  You completed a dual diagnostic assessment. You had labs drawn and those results were reviewed with you. Please take a copy of your lab work with you to your next primary care physician appointment.    Symptoms to Report:  If you experience more anxiety, confusion, sleeplessness, deep sadness or thoughts of suicide, notify your treatment team or notify your primary care physician. IF ANY OF THE SYMPTOMS YOU ARE EXPERIENCING ARE A MEDICAL EMERGENCY CALL 911 IMMEDIATELY.     Lifestyle Adjustment: Adjust your lifestyle to get enough sleep, relaxation, exercise and good nutrition. Continue to develop healthy coping skills to decrease stress and promote a sober living environment. Do not use mood altering substances including alcohol, illegal drugs or addictive medications other than what is currently prescribed.     Disposition: Home    Treatment Follow-Up:   [facility] Dual Disorders Program  You have been recommended to attend the outpatient dual disorders program  "(AKA MICD program) here at [facility].  Start date/time: Tuesday, 12/15/20 @ 11 AM  *This program is currently being offered via telehealth due to COVID-19. Please work with the program directly to set this up prior to your start date.*  Group schedule: M-F 11am-2pm  Dual Disorder Program Phone: [phone]  About the program: \"This day treatment program provides a safe and supportive environment to address the complexities of both mental health symptoms and problematic substance abuse. Staff provide an integrated approach and meet you at your own stage of change. This abstinence-based program supports long-term stability and recovery. Groups meet Monday through Friday for three hours each day for about six-to-eight weeks. Services also include regular follow-up with a physician who specializes in mental health and addiction medicine.\"     Facts about COVID19 at www.cdc.gov/COVID19 and www.[place].gov/covid19    Keeping hands clean is one of the most important steps we can take to avoid getting sick and spreading germs to others.  Please wash your hands frequently and lather with soap for at least 20 seconds!    Follow-up Appointment:   Please follow up with your primary care provider within 30 days of discharge.       Recovery apps for your phone to locate current in person and zoom recovery meetings  Pink [place]on - meeting corky  AA  - meeting corky  Meeting guide - meeting corky  Quick NA meeting - meeting corky  Marko- has various apps    Resources:  *due to covid-19 most AA/NA meetings are being held online*  AA meetings can be found online; search for them at: http://aa-intergroup.org/directory.php  AA meetings via ZOOM for [place] area can be found online at: https://a[place].org/find-a-meeting/holiday-closings/  NA meetings via ZOOM for [place] area can be found online at: https://sites.google.com/view/[place]regionofnarcoticsanonymous/home?authuser=2  AA/NA and Sponsors are excellent resources for support " and you can find one at any support group meeting.   Alcoholics Anonymous (www.alcoholics-anonymous.org): for local information 24 hours/day  AA Intergroup service office in [place] (http://www.aastpaul.org/) 750.558.1795  AA Intergroup service office in [facility]: [phone]. (http://www.a[place].org/)  Narcotics Anonymous (www.naminn[place].org) (757) 526-7164  https://aafairviewriverside.org/meetings  SMART Recovery - self management for addiction recovery:  www.smartrecdoouby.org  Pathways ~ A Health Crisis Resource & Support Center:  111.462.1349.  https://prescribetoprevent.org/patient-education/videos/  http://www.harmreduction.org  [facility] 522-911-6310  Support Group:  AA/NA and Sponsor/support.  National [place] on Mental Illness (www.[place].teddy.org): 498.483.3047 or 402-160-0048.  Alcoholics Anonymous (www.alcoholics-anonymous.org): Check your phone book for your local chapter.  Suicide Awareness Voices of Education (SAVE) (www.save.org): 544-647-WFTA (5975)  National Suicide Prevention Line (www.mentalhealthmn.org): 571-904-BMHU (0612)  Mental Health Consumer/Survivor Network of [place] (www.mhcsn.net): 189.398.8782 or 935-174-4594  Mental Health Association of [place] (www.mentalhealth.org): 194.996.9728 or 821-315-0847   Substance Abuse and Mental Health Services (www.samhsa.gov)  [place] Opioid Prevention Coalition: www.opioidcoalition.org    [place] Recovery Connection (MRC)  MRC connects people seeking recovery to resources that help foster and sustain long-term recovery.  Whether you are seeking resources for treatment, transportation, housing, job training, education, health care or other pathways to recovery, [facility] is a great place to start.  370.639.4749.  www.Digiboo.org    Great Pod casts for nutrition and wellness  Listen on Apple Podcasts  Dishing Up Nutrition   [employer]   Nutrition       Understand the connection between what you  eat and how you feel. Hosted by licensed nutritionists and dietitians from [employer] we share practical, real-life solutions for healthier living through nutrition.     General Medication Instructions:   See your medication sheet(s) for instructions.   Take all medications as prescribed.  Make no changes unless your doctor suggests them.   Go to all your doctor visits.  Be sure to have all your required lab tests. This way, your medicines can be refilled on time.  Do not use any forms of alcohol.    Please Note:  If you have any questions at anytime after you are discharged please call [facility] detox unit 3AW at [phone].  [facility], Behavioral Intake [phone]  Medical Records call [phone]  Outpatient Behavioral Intake call [phone]  LP+ Wait List/Bed Availability call [phone]    Please remember to take all of your behavioral discharge planning and lab paperwork to any follow up appointments, it contains your lab results, diagnosis, medication list and discharge recommendations.      THANK YOU FOR CHOOSING [facility]

## 2020-12-14 ENCOUNTER — TELEPHONE (OUTPATIENT)
Dept: BEHAVIORAL HEALTH | Facility: CLINIC | Age: 30
End: 2020-12-14

## 2020-12-14 ENCOUNTER — BEH TREATMENT PLAN (OUTPATIENT)
Dept: BEHAVIORAL HEALTH | Facility: CLINIC | Age: 30
End: 2020-12-14
Attending: PSYCHIATRY & NEUROLOGY

## 2020-12-14 DIAGNOSIS — F33.1 MAJOR DEPRESSIVE DISORDER, RECURRENT EPISODE, MODERATE (H): ICD-10-CM

## 2020-12-14 LAB
INTERPRETATION ECG - MUSE: NORMAL

## 2020-12-14 NOTE — TELEPHONE ENCOUNTER
Called pt to complete admission paperwork but call went right to voicemail. Writer left a voicemail with a callback number.    Mel Jameson RN on 12/14/2020 at 3:54 PM

## 2020-12-15 ENCOUNTER — TELEPHONE (OUTPATIENT)
Dept: BEHAVIORAL HEALTH | Facility: CLINIC | Age: 30
End: 2020-12-15

## 2020-12-15 ENCOUNTER — HOSPITAL ENCOUNTER (OUTPATIENT)
Dept: BEHAVIORAL HEALTH | Facility: CLINIC | Age: 30
End: 2020-12-15
Attending: PSYCHIATRY & NEUROLOGY
Payer: COMMERCIAL

## 2020-12-15 PROBLEM — F33.1 MAJOR DEPRESSIVE DISORDER, RECURRENT EPISODE, MODERATE (H): Status: ACTIVE | Noted: 2020-12-15

## 2020-12-15 PROCEDURE — 90853 GROUP PSYCHOTHERAPY: CPT | Mod: GT | Performed by: PSYCHOLOGIST

## 2020-12-15 PROCEDURE — G0177 OPPS/PHP; TRAIN & EDUC SERV: HCPCS | Mod: 95 | Performed by: OCCUPATIONAL THERAPIST

## 2020-12-15 NOTE — TELEPHONE ENCOUNTER
Writer called patient to complete program admission paperwork but patient did not answer. Will try call again.    Mel Jameson RN on 12/15/2020 at 8:52 AM

## 2020-12-15 NOTE — PROGRESS NOTES
"Outpatient Mental Health Services - Adult     MY COPING PLAN FOR SAFETY     PATIENT'S NAME:    Jennifer Cantu  MRN:                           [id number]     SAFETY PLAN:     Step 1: Warning signs / cues (Thoughts, images, mood, situation, behavior) that a crisis may be developing:     ? Thoughts: \"I don't matter\" and \"Nothing makes it better\"  ? Images: none  ? Thinking Processes: ruminations (can't stop thinking about my problems): financial issues  ? Mood: worsening depression and intense worry  ? Behaviors: using alcohol  ? Situations: changes in symptoms: worsening symtpoms   ?    Step 2: Coping strategies - Things I can do to take my mind off of my problems without contacting another person (relaxation technique, physical activity):     ? Distress Tolerance Strategies:   Do self soothing - take a bath, do a face masks  ? Physical Activities: lift weights and ride bike  ? Focus on helpful thoughts:  \"I will get through this\"     Step 3: People and social settings that provide distraction:                 Name: Krishna (partner)    Phone:               Name: Rahul (Brother)         Phone:                                Step 4: Remind myself of people and things that are important to me and worth living for:  Daughter and Family     Step 5: When I am in crisis, I can ask these people to help me use my safety plan:                 Name: Thais (Cousin)          Phone:               Name: Krishna (partner)    Phone:      Step 6: Making the environment safe:      ? remove alcohol and be around others     Step 7: Professionals or agencies I can contact during a crisis:     ? Suicide Prevention Lifeline: 7-890-200-TALK (9494)  ? Crisis Text Line Service (available 24 hours a day, 7 days a week): Text [place] to 862839  ? Call  **CRISIS (343677) from a cell phone to talk to a team of professionals who can help you.          Crisis Services By [facility]: Phone Number:   [place]     [phone]   [place]    [phone]   [place]" [phone]   [place]    [phone]   [place]    [phone]   [place] [phone]   [place]     [phone]      ? Call 911 or go to my nearest emergency department.             I helped develop this safety plan and agree to use it when needed.  I have been given a copy of this plan.          Today s date:  12/11/2020  Adapted from Safety Plan Template 2008 Eliza Ellis and Eligio Haley is reprinted with the express permission of the authors.  No portion of the Safety Plan Template may be reproduced without the express, written permission.  You can contact the authors at [email or web address] or [email or web address]

## 2020-12-15 NOTE — PROGRESS NOTES
"RN Review of Medical History / Physical Health Screen  Outpatient Mental Health Programs - [facility] Adult Dual Diagnosis Day Treatment    PATIENT'S NAME: Jennifer Cantu  MRN:   [id number]  :   1990  ACCT. NUMBER: [id number]  CURRENT AGE:  30 year old    DATE OF DIAGNOSTIC ASSESSMENT: 2020  DATE OF ADMISSION: 12/15/2020     Please see Diagnostic Assessment for additional Medical History.     General Health:   Have you had any exposure to any communicable disease in the past 2-3 weeks? no     Are you aware of safe sex practices? yes       Nutrition:    Are you on a special diet? If yes, please explain:  no   Do you have any concerns regarding your nutritional status? If yes, please explain:  Yes; but has an appointment with a nutritionist next week. Hx of WLS (gastric sleeve)   Have you had any appetite changes in the last 3 months?  No     Have you had any weight loss or weight gain in the last 3 months?  No     Do you have a history of an eating disorder? no   Do you have a history of being in an eating disorder program? no     Patient height and weight recorded by RN in epic flowsheet: No; Unable to measure  Because of temporary in-person programmatic suspension due to COVID-19 pandemic, all pt weights and heights will be collected through patient self-report an recorded in physical health screening progress note upon admission to the program.                            Height/Weight Review:  Patient reported height:  5'8\"      Patient reports weight:  Date last checked:  170 pounds   Any referrals/needs identified?  None identified at this time          Fall Risk:   Have you had any falls in the past 3 months? Yes, but no injuries but reports that they have a balance disorder     Do you currently use any assistive devices for mobility?   no      Additional Comments/Assessment: None identified at this time    Per completion of the Medical History / Physical Health Screen, is there a " recommendation to see / follow up with a primary care physician/clinic or dentist?    No.      Mel Jameson RN  12/15/2020

## 2020-12-15 NOTE — GROUP NOTE
Psychoeducation Group Note    PATIENT'S NAME: Jennifer Cantu  MRN:   [id number]  :   1990  ACCT. NUMBER: [id number]  DATE OF SERVICE: 12/15/20  START TIME: 11:00 AM  END TIME: 11:50 AM  FACILITATOR: Jayashree Ling OTR/L  TOPIC: MH Life Skills Group: Sensory Approaches in Mental Health  [facility] Adult Dual Diagnosis Day Treatment  TRACK: 3    NUMBER OF PARTICIPANTS: 7  Telemedicine Visit: The patient's condition can be safely assessed and treated via synchronous audio and visual telemedicine encounter.      Reason for Telemedicine Visit: Services only offered telehealth    Originating Site (Patient Location): Patient's home    Distant Site (Provider Location): [facility]: [facility]    Consent:  The patient/guardian has verbally consented to: the potential risks and benefits of telemedicine (video visit) versus in person care; bill my insurance or make self-payment for services provided; and responsibility for payment of non-covered services.     Mode of Communication:  Video Conference via Zoom    As the provider I attest to compliance with applicable laws and regulations related to telemedicine.    Summary of Group / Topics Discussed:  Sensory Approaches in Mental Health:  Self Regulation Through Sensory Modulation:  Patients were provided with education on Autonomic Nervous System activation and taught skills that can be used immediately to help them calm down and regain self control.  Patients were taught how to recognize specific signs and symptoms of their individualized state of arousal and how to make changes when needed.  Patients explored body based skills (bottom up processing skills) and techniques to help manage symptoms and change level of arousal when needed to be in control and comfortable so they are able to function in different environments.       Patient Session Goals / Objectives:    Identified specific and individualized neurosensory skills to help when  distressed and for crisis management    Identified signs and symptoms of current level of arousal and ways to make changes in level of arousal when needed    Established a plan for practice of these skills in their own environments        Patient Participation / Response:  Moderately participated, sharing some personal reflections / insights and adequately adequately received / provided feedback with other participants.    Patient presentation: shared briefly at times for first group, did nonverbals to note things she does practice and open to try techniques offered, Verbalized understanding of content and Patient would benefit from additional opportunities to practice the content to be able to generalize it to their everyday life with increased intentionality, consistency, and efficacy in support of their psychiatric recovery    Treatment Plan:  Patient has an initial individualized treatment plan that was created as part of their diagnostic assessment / admission process.  A master individualized treatment plan is in the process of being developed with the patient and multi-disciplinary care team.    Jayashree Ling, OTR/L

## 2020-12-15 NOTE — TELEPHONE ENCOUNTER
Attempted to call patient again to complete admission paperwork. Call went straight to voicemail. Writer left a message requesting a callback and provided a callback number.    Mel Jameson RN on 12/15/2020 at 9:04 AM

## 2020-12-15 NOTE — PROGRESS NOTES
Admission Date: 12/15/2020    Identify any current concerns with potential impact to admission:     medication/medical concerns: Denies     immediate safety concerns: Denies    Does patient have safety plan? Yes  Note: Please copy safety plan copied into BEH Encounter     Other (insurance/childcare/transportation/housing/planned absences/etc): Patient has an appointment tomorrow and the following Tuesday (12/22). Also has an appointment with bariatric provider on NYE.     Patient's insurance is: [employer] .     Does patient need appointment with provider? No    Review patient's program schedule and inform them of any variation due to late days or holidays.                                                                                  (delete if not applicable):   For Dual Disorder Outpatient Program:     Patient reported her last use of substances as: Had wine on Sunday night.     Patient reports the following concerns in regards to withdrawal/intoxication: Denies        Completed by: Mel Jameson RN on 12/15/2020 at 1:23 PM

## 2020-12-16 ENCOUNTER — HOSPITAL ENCOUNTER (OUTPATIENT)
Dept: BEHAVIORAL HEALTH | Facility: CLINIC | Age: 30
End: 2020-12-16
Attending: PSYCHIATRY & NEUROLOGY
Payer: COMMERCIAL

## 2020-12-16 PROCEDURE — 90853 GROUP PSYCHOTHERAPY: CPT | Mod: 95 | Performed by: PSYCHOLOGIST

## 2020-12-16 PROCEDURE — G0177 OPPS/PHP; TRAIN & EDUC SERV: HCPCS | Mod: GT

## 2020-12-16 NOTE — GROUP NOTE
"Process Group Note    PATIENT'S NAME: Jennifer Cantu  MRN:   [id number]  :   1990  ACCT. NUMBER: [id number]  DATE OF SERVICE: 12/15/20  START TIME: 12:00 PM  END TIME: 12:50 PM  FACILITATOR: Nathaniel Ely LP  TOPIC:  Process Group    Diagnoses:  296.32 (F33.1) Major Depressive Disorder, Recurrent Episode, Moderate by history   Substance-Related & Addictive Disorders Alcohol Use Disorder   303.90 (F10.20) Severe     [facility] Adult Dual Diagnosis Day Treatment  TRACK: 3    NUMBER OF PARTICIPANTS: 7    Telemedicine Visit: The patient's condition can be safely assessed and treated via synchronous audio and visual telemedicine encounter.      Reason for Telemedicine Visit: Services only offered telehealth    Originating Site (Patient Location): Patient's home    Distant Site (Provider Location): Provider Remote Setting    Consent:  The patient/guardian has verbally consented to: the potential risks and benefits of telemedicine (video visit) versus in person care; bill my insurance or make self-payment for services provided; and responsibility for payment of non-covered services.     Mode of Communication:  Video Conference via Viximo    As the provider I attest to compliance with applicable laws and regulations related to telemedicine.            Data:    Session content: At the start of this group, patients were invited to check in by identifying themselves, describing their current emotional status, and identifying issues to address in this group.   Area(s) of treatment focus addressed in this session included Symptom Management, Personal Safety, Community Resources/Discharge Planning, Abstinence/Relapse Prevention, Develop / Improve Independent Living Skills and Develop Socialization / Interpersonal Relationship Skills.    Pt reports she feels \"tired.\" She reports she and her partner moved in to a new place. Pt reports being distracted is a barrier. Pt reports she left detox " the past Saturday and relapsed on vodka. She reports Sunday is her last use.     Therapeutic Interventions/Treatment Strategies:  Psychotherapist offered support, feedback and validation. Treatment modalities used include Cognitive Behavioral Therapy. Interventions include Relapse Prevention: Facilitated understanding the importance of awareness of factors that contribute to relapse .    Assessment:    Patient response:   Patient responded to session by accepting feedback, giving feedback, listening, focusing on goals, being attentive, accepting support and appearing alert    Possible barriers to participation / learning include: and no barriers identified    Health Issues:   None reported       Substance Use Review:   Substance Use: alcohol .     Mental Status/Behavioral Observations  Appearance:   Appropriate   Eye Contact:   Good   Psychomotor Behavior: Normal   Attitude:   Cooperative   Orientation:   All  Speech   Rate / Production: Normal    Volume:  Normal   Mood:    Depressed   Affect:    Constricted   Thought Content:   Clear and Safety denies any current safety concerns including suicidal ideation, self-harm, and homicidal ideation  Thought Form:  Coherent  Logical     Insight:    Good     Plan:     Safety Plan: Recommended that patient call 911 or go to the local ED should there be a change in any of these risk factors.     Barriers to treatment: None identified    Patient Contracts (see media tab):  None    Substance Use: Stage of Change: Action     Continue or Discharge: Patient will continue in Adult Dual Disorder Program (DDP) as planned. Patient is likely to benefit from learning and using skills as they work toward the goals identified in their treatment plan.      Nathaniel Ely LP  December 15, 2020

## 2020-12-16 NOTE — GROUP NOTE
Psychoeducation Group Note    PATIENT'S NAME: Jennifer Cantu  MRN:   [id number]  :   1990  ACCT. NUMBER: [id number]  DATE OF SERVICE: 20  START TIME: 12:00 PM  END TIME: 12:50 PM  FACILITATOR: Mel Jameson RN  TOPIC: GUANAKO RN Group: Brain Health  [facility] Adult Dual Diagnosis Day Treatment  TRACK: 3    NUMBER OF PARTICIPANTS: 5    Telemedicine Visit: The patient's condition can be safely assessed and treated via synchronous audio and visual telemedicine encounter.      Reason for Telemedicine Visit: Services only offered telehealth    Originating Site (Patient Location): Patient's home    Distant Site (Provider Location): Provider Remote Setting    Consent:  The patient/guardian has verbally consented to: the potential risks and benefits of telemedicine (video visit) versus in person care; bill my insurance or make self-payment for services provided; and responsibility for payment of non-covered services.     Mode of Communication:  Video Conference via [employer]    As the provider I attest to compliance with applicable laws and regulations related to telemedicine.    Summary of Group / Topics Discussed:  Brain Health:  Pathophysiology of Mood Disorders (PART 2 OF 2): Patients were educated on mood disorder etiology and neuroscience, risk factors, symptoms, and pharmacologic, psychotherapeutic, and complementary treatment options. Patients were guided on a discussion of mental, behavioral, and physical symptoms and shared their symptoms with the group.     Patient Session Goals / Objectives:  ? Described what mood disorders are and identified risk factors   ? Explained how chemical imbalances in the brain can cause symptoms and how medications work to reverse this imbalance   ? Identified and described pharmacologic, psychotherapeutic, and complementary treatment options      Patient Participation / Response:  Fully participated with the group by sharing personal reflections / insights and  openly received / provided feedback with other participants.    Demonstrated understanding of topics discussed through group discussion and participation, Identified / Expressed personal readiness to practice skills and Verbalized understanding of brain health topic    Treatment Plan:  Patient has a current master individualized treatment plan.  See Epic treatment plan for more information.    Mel Jameson RN

## 2020-12-16 NOTE — GROUP NOTE
Psychotherapy Group Note    PATIENT'S NAME: Jennifer Cantu  MRN:   [id number]  :   1990  ACCT. NUMBER: [id number]  DATE OF SERVICE: 12/15/20  START TIME:  1:00 PM  END TIME:  1:50 PM  FACILITATOR: Nathaniel Ely LP  TOPIC:  EBP Group: Relationship Skills  [facility] Adult Dual Diagnosis Day Treatment  TRACK: 3    NUMBER OF PARTICIPANTS: 7    Telemedicine Visit: The patient's condition can be safely assessed and treated via synchronous audio and visual telemedicine encounter.      Reason for Telemedicine Visit: Services only offered telehealth    Originating Site (Patient Location): Patient's home    Distant Site (Provider Location): Provider Remote Setting    Consent:  The patient/guardian has verbally consented to: the potential risks and benefits of telemedicine (video visit) versus in person care; bill my insurance or make self-payment for services provided; and responsibility for payment of non-covered services.     Mode of Communication:  Video Conference via [employer]    As the provider I attest to compliance with applicable laws and regulations related to telemedicine.      Summary of Group / Topics Discussed:  Relationship Skills: Boundaries: Patients were provided with a general overview of interpersonal boundaries and how lack of boundaries relates to symptoms and functioning. The purpose is to help patients identify boundary issues and gain awareness and skills to work towards healthier interpersonal boundaries. Current awareness of healthy boundary characteristics and barriers to establishing healthy boundaries were discussed.    Patient Session Goals / Objectives:    Familiarized patients with the concept of interpersonal boundaries and their characteristics    Discussed and practiced strategies to promote healthier interpersonal boundaries    Identified boundary issues and identified plan to improve boundaries      Patient Participation / Response:  Minimally  participated, only when prompted / asked.    Demonstrated understanding of topics discussed through group discussion and participation    Treatment Plan:  Patient has an initial individualized treatment plan that was created as part of their diagnostic assessment / admission process.  A master individualized treatment plan is in the process of being developed with the patient and multi-disciplinary care team.    Nathaniel Ely, LP

## 2020-12-16 NOTE — GROUP NOTE
Process Group Note    PATIENT'S NAME: Jennifer Cantu  MRN:   [id number]  :   1990  ACCT. NUMBER: [id number]  DATE OF SERVICE: 20  START TIME: 11:00 AM  END TIME: 11:50 AM  FACILITATOR: Nathaniel Ely LP  TOPIC:  Process Group    Diagnoses:  296.32 (F33.1) Major Depressive Disorder, Recurrent Episode, Moderate by history   Substance-Related & Addictive Disorders Alcohol Use Disorder   303.90 (F10.20) Severe     [facility] Adult Dual Diagnosis Day Treatment  TRACK: 3    NUMBER OF PARTICIPANTS: 5    Telemedicine Visit: The patient's condition can be safely assessed and treated via synchronous audio and visual telemedicine encounter.      Reason for Telemedicine Visit: Services only offered telehealth    Originating Site (Patient Location): Patient's home    Distant Site (Provider Location): Provider Remote Setting    Consent:  The patient/guardian has verbally consented to: the potential risks and benefits of telemedicine (video visit) versus in person care; bill my insurance or make self-payment for services provided; and responsibility for payment of non-covered services.     Mode of Communication:  Video Conference via [employer]    As the provider I attest to compliance with applicable laws and regulations related to telemedicine.            Data:    Session content: At the start of this group, patients were invited to check in by identifying themselves, describing their current emotional status, and identifying issues to address in this group.   Area(s) of treatment focus addressed in this session included Symptom Management, Personal Safety, Community Resources/Discharge Planning, Abstinence/Relapse Prevention, Develop / Improve Independent Living Skills and Develop Socialization / Interpersonal Relationship Skills.    Pt reports feeling alert and determined. Pt reports her daughter's birthday coming up is a stress.    Pt reports she thinks she will be tempted to use  "alcohol by a work event tonight. Pt reports being in a social setting thru work leads to anxiety which then leads to use.     Pt reports she used a \"little\" cannabis last night and this AM.     Therapeutic Interventions/Treatment Strategies:  Psychotherapist offered support, feedback and validation. Treatment modalities used include Cognitive Behavioral Therapy. Interventions include Coping Skills: Assisted patient in identifying 1-2 healthy distraction skills to reduce overall distress and Relapse Prevention: Facilitated understanding the importance of awareness of factors that contribute to relapse  and Assisted patient in identifying personal vulnerabilities, thoughts, emotions, and situations that may lead to relapse .    Assessment:    Patient response:   Patient responded to session by accepting feedback, giving feedback, listening, focusing on goals, being attentive, accepting support and appearing alert    Possible barriers to participation / learning include: and no barriers identified    Health Issues:   None reported       Substance Use Review:   Substance Use: alcohol .     Mental Status/Behavioral Observations  Appearance:   Appropriate   Eye Contact:   Good   Psychomotor Behavior: Normal   Attitude:   Cooperative   Orientation:   All  Speech   Rate / Production: Normal    Volume:  Normal   Mood:    Anxious   Affect:    Appropriate   Thought Content:   Clear  Thought Form:  Coherent  Logical     Insight:    Good     Plan:     Safety Plan: Recommended that patient call 911 or go to the local ED should there be a change in any of these risk factors.     Barriers to treatment: None identified    Patient Contracts (see media tab):  None    Substance Use: Stage of Change: Action     Continue or Discharge: Patient will continue in Adult Dual Disorder Program (DDP) as planned. Patient is likely to benefit from learning and using skills as they work toward the goals identified in their treatment " plan.      Nathaniel Ely, CECELIA  December 16, 2020

## 2020-12-17 ENCOUNTER — TELEPHONE (OUTPATIENT)
Dept: BEHAVIORAL HEALTH | Facility: CLINIC | Age: 30
End: 2020-12-17

## 2020-12-18 ENCOUNTER — HOSPITAL ENCOUNTER (OUTPATIENT)
Dept: BEHAVIORAL HEALTH | Facility: CLINIC | Age: 30
End: 2020-12-18
Attending: PSYCHIATRY & NEUROLOGY
Payer: COMMERCIAL

## 2020-12-18 PROCEDURE — G0177 OPPS/PHP; TRAIN & EDUC SERV: HCPCS | Mod: GT | Performed by: OCCUPATIONAL THERAPIST

## 2020-12-18 PROCEDURE — 90853 GROUP PSYCHOTHERAPY: CPT | Mod: 95 | Performed by: PSYCHOLOGIST

## 2020-12-18 NOTE — GROUP NOTE
Process Group Note    PATIENT'S NAME: Jennifer Cantu  MRN:   [id number]  :   1990  ACCT. NUMBER: [id number]  DATE OF SERVICE: 20  START TIME: 11:00 AM  END TIME: 11:50 AM  FACILITATOR: Nathaniel Ely LP  TOPIC:  Process Group    Diagnoses:  296.32 (F33.1) Major Depressive Disorder, Recurrent Episode, Moderate by history   Substance-Related & Addictive Disorders Alcohol Use Disorder   303.90 (F10.20) Severe     [facility] Adult Dual Diagnosis Day Treatment  TRACK: 3    NUMBER OF PARTICIPANTS: 5    Telemedicine Visit: The patient's condition can be safely assessed and treated via synchronous audio and visual telemedicine encounter.      Reason for Telemedicine Visit: Services only offered telehealth    Originating Site (Patient Location): Patient's home    Distant Site (Provider Location): Provider Remote Setting    Consent:  The patient/guardian has verbally consented to: the potential risks and benefits of telemedicine (video visit) versus in person care; bill my insurance or make self-payment for services provided; and responsibility for payment of non-covered services.     Mode of Communication:  Video Conference via [employer]    As the provider I attest to compliance with applicable laws and regulations related to telemedicine.            Data:    Session content: At the start of this group, patients were invited to check in by identifying themselves, describing their current emotional status, and identifying issues to address in this group.   Area(s) of treatment focus addressed in this session included Symptom Management, Personal Safety, Community Resources/Discharge Planning, Abstinence/Relapse Prevention, Develop / Improve Independent Living Skills and Develop Socialization / Interpersonal Relationship Skills.    Pt reports she used alcohol Wednesday evening to the point of blacking out and ended up in nursing home. Pt reports she does not know what happened. No charges have  been filed yet. Pt reports she was at a [place] and was excited to see people. Pt reports she is sad and depressed.       Therapeutic Interventions/Treatment Strategies:  Psychotherapist offered support, feedback and validation. Treatment modalities used include Cognitive Behavioral Therapy. Interventions include Relapse Prevention: Facilitated understanding the importance of awareness of factors that contribute to relapse .    Assessment:    Patient response:   Patient responded to session by accepting feedback, giving feedback, listening, focusing on goals, being attentive, accepting support and appearing alert    Possible barriers to participation / learning include: and no barriers identified    Health Issues:   None reported       Substance Use Review:   Substance Use: alcohol .     Mental Status/Behavioral Observations  Appearance:   Appropriate   Eye Contact:   Good   Psychomotor Behavior: Normal   Attitude:   Cooperative   Orientation:   All  Speech   Rate / Production: Normal    Volume:  Normal   Mood:    Depressed   Affect:    Appropriate   Thought Content:   Clear  Thought Form:  Coherent  Logical     Insight:    Good     Plan:     Safety Plan: Recommended that patient call 911 or go to the local ED should there be a change in any of these risk factors.     Barriers to treatment: None identified    Patient Contracts (see media tab):  None    Substance Use: Stage of Change: Preparatory     Continue or Discharge: Patient will continue in Adult Dual Disorder Program (DDP) as planned. Patient is likely to benefit from learning and using skills as they work toward the goals identified in their treatment plan.      Nathaniel Ely LP  December 18, 2020

## 2020-12-18 NOTE — GROUP NOTE
Psychotherapy Group Note    PATIENT'S NAME: Jennifer Cantu  MRN:   [id number]  :   1990  ACCT. NUMBER: [id number]  DATE OF SERVICE: 20  START TIME: 12:00 PM  END TIME: 12:50 PM  FACILITATOR: Nathaniel Ely LP  TOPIC:  EBP Group: DDP Relapse Prevention  [facility] Adult Dual Diagnosis Day Treatment  TRACK: 3    NUMBER OF PARTICIPANTS: 5    Telemedicine Visit: The patient's condition can be safely assessed and treated via synchronous audio and visual telemedicine encounter.      Reason for Telemedicine Visit: Services only offered telehealth    Originating Site (Patient Location): Patient's home    Distant Site (Provider Location): Provider Remote Setting    Consent:  The patient/guardian has verbally consented to: the potential risks and benefits of telemedicine (video visit) versus in person care; bill my insurance or make self-payment for services provided; and responsibility for payment of non-covered services.     Mode of Communication:  Video Conference via [employer]    As the provider I attest to compliance with applicable laws and regulations related to telemedicine.      Summary of Group / Topics Discussed:  DDP Relapse Prevention: Goodbye Letter: Patients were assigned topic of writing a goodbye letter to their substance of abuse and presented the goodbye letter to the group. Purpose of this assignment is to process grief around loss of substance and coping with emotions of not having substances as a way to cope with stressors and difficult emotions. This assignment highlights maladaptive relationship patient has with the substance and gain awareness about how substance use impacted functioning. This group is conducted in a two part series. First group is education about assignment and reading example goodbye letters. Second group allows patients to read their completed goodbye letters to group members and receive feedback and validation.     Patient Session Goals /  Objectives:    Processed grief and loss of the substances    Gained awareness of maladaptive relationship with substance     Received support, feedback, and validation from peers        Patient Participation / Response:  Fully participated with the group by sharing personal reflections / insights and openly received / provided feedback with other participants.    Demonstrated understanding of topics discussed through group discussion and participation    Treatment Plan:  Patient has a current master individualized treatment plan.  See Epic treatment plan for more information.    Nathaniel Ely, LP

## 2020-12-18 NOTE — GROUP NOTE
Psychoeducation Group Note    PATIENT'S NAME: Jennifer Cantu  MRN:   [id number]  :   1990  ACCT. NUMBER: [id number]  DATE OF SERVICE: 20  START TIME:  1:00 PM  END TIME:  1:50 PM  FACILITATOR: Jayashree Ling OTR/L  TOPIC: MH Life Skills Group: Resiliency Development  [facility] Adult Dual Diagnosis Day Treatment  TRACK: 3    NUMBER OF PARTICIPANTS: 5  Telemedicine Visit: The patient's condition can be safely assessed and treated via synchronous audio and visual telemedicine encounter.      Reason for Telemedicine Visit: Services only offered telehealth    Originating Site (Patient Location): Patient's home    Distant Site (Provider Location): [facility]: [facility]    Consent:  The patient/guardian has verbally consented to: the potential risks and benefits of telemedicine (video visit) versus in person care; bill my insurance or make self-payment for services provided; and responsibility for payment of non-covered services.     Mode of Communication:  Video Conference via [employer]    As the provider I attest to compliance with applicable laws and regulations related to telemedicine.      Summary of Group / Topics Discussed:  Resiliency Development:  Coping Skills: Personal Recovery Inventory: Patients were taught how to identify coping strategies and routines that they can adopt and use for management with focus on a balance between physical, emotional, social and spiritual strategies.    Patient Session Goals / Objectives:    Identified personal definitions of recovery for effectively managing both mental health and substance abuse/abuse symptoms     Improved awareness of the process of recovery and skills and strategies that support this       Established a plan for practice of these skills in their own environments    Practiced and reflected on how to generalize taught skills to their everyday life        Patient Participation / Response:  Fully participated with the group  by sharing personal reflections / insights and openly received / provided feedback with other participants.    Patient presentation: did have camera off for time periods yet joined when asked; noted a couple of daily routines that are important for her to try to keep practicing, Verbalized understanding of content and Patient would benefit from additional opportunities to practice the content to be able to generalize it to their everyday life with increased intentionality, consistency, and efficacy in support of their psychiatric recovery    Treatment Plan:  Patient has an initial individualized treatment plan that was created as part of their diagnostic assessment / admission process.  A master individualized treatment plan is in the process of being developed with the patient and multi-disciplinary care team.    Jayashree Ling, TIFFANIER/L

## 2020-12-21 ENCOUNTER — HOSPITAL ENCOUNTER (OUTPATIENT)
Dept: BEHAVIORAL HEALTH | Facility: CLINIC | Age: 30
End: 2020-12-21
Attending: PSYCHIATRY & NEUROLOGY
Payer: COMMERCIAL

## 2020-12-21 PROCEDURE — G0177 OPPS/PHP; TRAIN & EDUC SERV: HCPCS | Mod: 95

## 2020-12-21 PROCEDURE — G0177 OPPS/PHP; TRAIN & EDUC SERV: HCPCS | Mod: 95 | Performed by: OCCUPATIONAL THERAPIST

## 2020-12-21 PROCEDURE — 90853 GROUP PSYCHOTHERAPY: CPT | Mod: 95 | Performed by: PSYCHOLOGIST

## 2020-12-21 NOTE — GROUP NOTE
Psychoeducation Group Note    PATIENT'S NAME: Jennifer Cantu  MRN:   [id number]  :   1990  ACCT. NUMBER: [id number]  DATE OF SERVICE: 20  START TIME:  1:00 PM  END TIME:  1:50 PM  FACILITATOR: Jayashree Ling OTR/L  TOPIC: MH Life Skills Group: Cognitive Functioning  [facility] Adult Dual Diagnosis Day Treatment  TRACK: 3    NUMBER OF PARTICIPANTS: 4  Telemedicine Visit: The patient's condition can be safely assessed and treated via synchronous audio and visual telemedicine encounter.      Reason for Telemedicine Visit: Services only offered telehealth    Originating Site (Patient Location): Patient's home    Distant Site (Provider Location): [facility]: [facility]    Consent:  The patient/guardian has verbally consented to: the potential risks and benefits of telemedicine (video visit) versus in person care; bill my insurance or make self-payment for services provided; and responsibility for payment of non-covered services.     Mode of Communication:  Video Conference via [employer]    As the provider I attest to compliance with applicable laws and regulations related to telemedicine.      Summary of Group / Topics Discussed:  Cognitive Functioning: Patients were taught and provided with an opportunity to gain awareness of how their mental health symptoms impact their current cognitive functioning as well as how this impacts their performance and participation in meaningful roles, relationships, and routines.  Patients were taught skills and strategies on how to monitor and improve cognitive performance through remediation or compensatory strategies.  Patients were given opportunities to practice taught skills and techniques in session and how to apply to everyday life.        Patient Session Goals / Objectives:    Identified how their mental health symptoms impact their functioning, focusing on specific cognitive challenges     Improved awareness of specific remediation  and/or compensatory strategies to improve  executive functioning skills and how this relates to mental health recovery        Established a plan for practice of these skills in their own environments    Practiced and reflected on how to generalize taught skills to their everyday life          Patient Participation / Response:  Fully participated with the group by sharing personal reflections / insights and openly received / provided feedback with other participants.    Patient presentation: able to note her challenges and a technique she can use for management, Verbalized understanding of content and Patient would benefit from additional opportunities to practice the content to be able to generalize it to their everyday life with increased intentionality, consistency, and efficacy in support of their psychiatric recovery    Treatment Plan:  Patient has an initial individualized treatment plan that was created as part of their diagnostic assessment / admission process.  A master individualized treatment plan is in the process of being developed with the patient and multi-disciplinary care team.    MEE Gates/[place]

## 2020-12-21 NOTE — GROUP NOTE
Psychoeducation Group Note    PATIENT'S NAME: Jennifer Cantu  MRN:   [id number]  :   1990  ACCT. NUMBER: [id number]  DATE OF SERVICE: 20  START TIME: 12:00 PM  END TIME: 12:50 PM  FACILITATOR: Mel Jameson RN  TOPIC: GUANAKO RN Group: Mental Health Maintenance  [facility] Adult Dual Diagnosis Day Treatment  TRACK: 3    NUMBER OF PARTICIPANTS: 4    Telemedicine Visit: The patient's condition can be safely assessed and treated via synchronous audio and visual telemedicine encounter.      Reason for Telemedicine Visit: Services only offered telehealth    Originating Site (Patient Location): Patient's home    Distant Site (Provider Location): Provider Remote Setting    Consent:  The patient/guardian has verbally consented to: the potential risks and benefits of telemedicine (video visit) versus in person care; bill my insurance or make self-payment for services provided; and responsibility for payment of non-covered services.     Mode of Communication:  Video Conference via [employer]    As the provider I attest to compliance with applicable laws and regulations related to telemedicine.    Summary of Group / Topics Discussed:  Mental Health Maintenance:  Vulnerability: In this group, the concept of vulnerability was explored through the viewing, discussion, and self-reflection of the Melvin Cisneros Talk Titled,  The Power of Vulnerability.      Patient Session Goals / Objectives:  ? Defined and described definition of vulnerability   ? Identified 2 or more ways of practicing authenticity       Patient Participation / Response:  Fully participated with the group by sharing personal reflections / insights and openly received / provided feedback with other participants.    Demonstrated understanding of topics discussed through group discussion and participation, Identified / Expressed personal readiness to practice skills and Verbalized understanding of mental health maintenance topic    Treatment  Plan:  Patient has an initial individualized treatment plan that was created as part of their diagnostic assessment / admission process.  A master individualized treatment plan is in the process of being developed with the patient and multi-disciplinary care team.    eMl Jameson RN

## 2020-12-22 ENCOUNTER — TELEPHONE (OUTPATIENT)
Dept: BEHAVIORAL HEALTH | Facility: CLINIC | Age: 30
End: 2020-12-22

## 2020-12-22 NOTE — GROUP NOTE
Process Group Note    PATIENT'S NAME: Jennifer Cantu  MRN:   [id number]  :   1990  ACCT. NUMBER: [id number]  DATE OF SERVICE: 20  START TIME: 11:00 AM  END TIME: 11:50 AM  FACILITATOR: Nathaniel Ely LP  TOPIC:  Process Group    Diagnoses:  296.32 (F33.1) Major Depressive Disorder, Recurrent Episode, Moderate by history   Substance-Related & Addictive Disorders Alcohol Use Disorder   303.90 (F10.20) Severe     [facility] Adult Dual Diagnosis Day Treatment  TRACK: 3    NUMBER OF PARTICIPANTS: 4    Telemedicine Visit: The patient's condition can be safely assessed and treated via synchronous audio and visual telemedicine encounter.      Reason for Telemedicine Visit: Services only offered telehealth    Originating Site (Patient Location): Patient's home    Distant Site (Provider Location): Provider Remote Setting    Consent:  The patient/guardian has verbally consented to: the potential risks and benefits of telemedicine (video visit) versus in person care; bill my insurance or make self-payment for services provided; and responsibility for payment of non-covered services.     Mode of Communication:  Video Conference via [employer]    As the provider I attest to compliance with applicable laws and regulations related to telemedicine.            Data:    Session content: At the start of this group, patients were invited to check in by identifying themselves, describing their current emotional status, and identifying issues to address in this group.   Area(s) of treatment focus addressed in this session included Symptom Management, Personal Safety, Community Resources/Discharge Planning, Abstinence/Relapse Prevention, Develop / Improve Independent Living Skills and Develop Socialization / Interpersonal Relationship Skills.    Pt reports she feels invisible at her new home in [place] and subservient to her S/O. Pt reports she is having second thoughts about life. Pt reports her  S/O thinks she can be a normal drinker but she does not think that is a good idea.     Therapeutic Interventions/Treatment Strategies:  Psychotherapist offered support, feedback and validation. Treatment modalities used include Cognitive Behavioral Therapy. Interventions include Relapse Prevention: Facilitated understanding the importance of awareness of factors that contribute to relapse  and Relationship Skills: Discussed strategies to promote healthier understanding of interpersonal relationships.    Assessment:    Patient response:   Patient responded to session by accepting feedback, giving feedback, listening, focusing on goals, being attentive, accepting support and appearing alert    Possible barriers to participation / learning include: and no barriers identified    Health Issues:   None reported       Substance Use Review:   Substance Use: alcohol .     Mental Status/Behavioral Observations  Appearance:   Appropriate   Eye Contact:   Good   Psychomotor Behavior: Normal   Attitude:   Cooperative   Orientation:   All  Speech   Rate / Production: Normal    Volume:  Normal   Mood:    Anxious  Depressed   Affect:    Constricted   Thought Content:   Clear and Safety denies any current safety concerns including suicidal ideation, self-harm, and homicidal ideation  Thought Form:  Coherent  Logical     Insight:    Good     Plan:     Safety Plan: Recommended that patient call 911 or go to the local ED should there be a change in any of these risk factors.     Barriers to treatment: None identified    Patient Contracts (see media tab):  None    Substance Use: Stage of Change: Action     Continue or Discharge: Patient will continue in Adult Dual Disorder Program (DDP) as planned. Patient is likely to benefit from learning and using skills as they work toward the goals identified in their treatment plan.      Nathaniel Ely LP  December 21, 2020

## 2020-12-22 NOTE — PROGRESS NOTES
Adult Dual Disorder Program:  Individualized Treatment Plan     Date of Plan: 20    Name: Jennifer Cantu MRN: [id number]    : 1990    Programs:  Adult Dual Disorder Program (DDP)    Clinical Track (if applicable):  3    DSM5 Diagnosis  296.32 (F33.1) Major Depressive Disorder, Recurrent Episode, Moderate by history   Substance-Related & Addictive Disorders Alcohol Use Disorder   303.90 (F10.20) Severe   4. Other Diagnoses that is relevant to services: Bipolar Disorder, per history per patient's report.    301.83 (F60.3) Borderline Personality Disorder per history    Adult Dual Disorder Program Multidisciplinary Team Members: Joana Cox MD, Zandra Hayes, [facility], [facility]; Efrain Ely MA, , [facility]; Anurag Hassan LMFT, [facility]; Ladonna Ling, OTR/L; Mel Jameson RN, PHN    Jennifer Cantu will participate in the Adult Dual Disorder Program  5 days per week, 3 hours per day. Anticipated duration/discharge: 6-8 weeks     Due to COVID-19, services will be delivered via telemedicine until further notice.        Program Start Date: 12/15/20  Anticipated Discharge Date: 21 (pending authorization/clinical changes)    NOTE: Complete CGI       Client Strengths:  goal-focused, has a previous history of therapy, insightful and support of family, friends and providers .      Client Participation in Plan:  Contributed to goals and plan   Agrees with plan   Received copy of treatment plan   Discussed with staff     Areas of Vulnerability:  Suicidal Ideation   Poor impulse control   Anxiety  Depressive symptoms   Substance Use     Long-Term Goals:  Knowledge about illness and management of symptoms   Maintenance of personal safety   Maintenance of sobriety   Effective management of impulsivity     Abuse Prevention Plan:  Safe, therapeutic environment   Safety coping plan as needed   Education regarding illness and skill development   Coordination with care providers   Impluse control education and intervention  "  Medication adjustment/management (MI/CD)   Monitor for use of substances    Discharge Criteria:  Satisfactory progress toward treatment goals   Improvement re: identified problems and symptoms   Ability to continue recovery at next level of service   Has a discharge plan in place   Has safety/coping plan in place   Ability to maintain sobriety  Share autobiography   Complete goodbye letter assignment   Complete coping cards   Regular attendance as scheduled     Areas of Treatment Focus        Area of Treatment Focus:   Personal Safety  Start Date:    12/22/20    Dimension:   III. Emotional / Behavioral Condition    Description:    Patient reports history of suicide attempts. Most recent in 2013.  Patient reports passive SI in the last two weeks. She denies intent and reports what keeps her from acting on thoughrs is \"Fear and I have suicide in my family and its a lot pain for people left behind and my daughter\"     Goal:  Target Date: 1/19/21 Status: Stopped  Client will notify staff when needing assistance to develop or implement a coping plan to manage suicidal or self injurious urges.  Client will use coping plan for safety, as needed.      Progress:   1/12/2021 STOPPED    Treatment Strategies:   Assist clients in establishing / strengthening support network  Assist to identify treatment goals  Assess / reassess for appropriate therapy program involvement, encourage participation in therapies  Engage in safety planning when indicated  Facilitate increased self awareness  Teach adaptive coping skills and communication skills        Area of Treatment Focus:   Symptom Stabilization and Management  Start Date:    12/22/20    Dimension:   III. Emotional / Behavioral Condition    Description:   The reason for seeking services at this time is: \" I know I'm an alcoholic and I can't stop and I need some support and I need to address issues instead of running for them. I think that outpatient is the best option because I " "am a mom and I do not want to leave her \"   The problem(s) began -got worse in April. Patient has attempted to resolve these concerns in the past through see's a therapist once a week.     Goal:  Target Date: 1/19/21 Status: Stopped  Will report on symptoms and identify skills to use to manage depression and anxiety      Progress:   GOAL STOPPED    Treatment Strategies:   Assist clients in establishing / strengthening support network  Assist to identify treatment goals  Assess / reassess for appropriate therapy program involvement, encourage participation in therapies  Facilitate increased self awareness  Provide education regarding symptom management  Teach adaptive coping skills and communication skills        Area of Treatment Focus:   Abstinence / Relapse Prevention  Start Date:    12/22/20    Dimension:   I. Acute Intoxication / Withdrawal Potential, IV. Treatment Acceptance / Resistance and V. Relapse    Description:    Patient was recently in detox and has completed other substance use disorder treatment.    Goal:  Target Date: 1/19/21 Status: Stopped  Client will identify my triggers for substance use by discussing in group and utilizing session education and handouts.       Progress:   1/12/2021 GOAL STOPPED    Treatment Strategies:   Assist clients in establishing / strengthening support network  Assist to identify treatment goals  Assess / reassess for appropriate therapy program involvement, encourage participation in therapies  Facilitate increased self awareness  Provide education regarding relapse management  Teach adaptive coping skills and communication skills        Area of Treatment Focus:   Community Resources / Support and Discharge Planning  Start Date:    12/22/20    Dimension:   VI. Recovery Environment    Description:    Patient currently has a therapist. She is unemployed and lives with partner and daughter in their home.She also is involved with [facility] probation at this time.    " "Goal:  Target Date: 1/19/21 Status: Stopped  Pt will obtain a therapist and psychiatrist.       Progress:   1/12/2021 STOPPED    Treatment Strategies:   Assist clients in establishing / strengthening support network  Assist to identify treatment goals  Assist with discharge planning  Assess / reassess for appropriate therapy program involvement, encourage participation in therapies     Jayashree Ling OTR/L      NOTE: Required signatures are completed manually and scanned into the electronic medical record. See \"Media\" tab in epic.    The Individualized Treatment Plan Signature Page has been routed to the provider for co-sign.     HPI      ROS      Physical Exam      "

## 2020-12-23 ENCOUNTER — HOSPITAL ENCOUNTER (OUTPATIENT)
Dept: BEHAVIORAL HEALTH | Facility: CLINIC | Age: 30
End: 2020-12-23
Attending: PSYCHIATRY & NEUROLOGY
Payer: COMMERCIAL

## 2020-12-23 PROCEDURE — G0177 OPPS/PHP; TRAIN & EDUC SERV: HCPCS | Mod: 95

## 2020-12-23 PROCEDURE — 90853 GROUP PSYCHOTHERAPY: CPT | Mod: GT | Performed by: MARRIAGE & FAMILY THERAPIST

## 2020-12-23 NOTE — GROUP NOTE
Psychoeducation Group Note    PATIENT'S NAME: Jennifer aCntu  MRN:   [id number]  :   1990  ACCT. NUMBER: [id number]  DATE OF SERVICE: 20  START TIME:  1:00 PM  END TIME:  1:50 PM  FACILITATOR: Mel Jameson RN  TOPIC: GUANAKO RN Group: Mental Health Maintenance  [facility] Adult Dual Diagnosis Day Treatment  TRACK: 3    NUMBER OF PARTICIPANTS: 3    Telemedicine Visit: The patient's condition can be safely assessed and treated via synchronous audio and visual telemedicine encounter.      Reason for Telemedicine Visit: Services only offered telehealth    Originating Site (Patient Location): Patient's home    Distant Site (Provider Location): Provider Remote Setting    Consent:  The patient/guardian has verbally consented to: the potential risks and benefits of telemedicine (video visit) versus in person care; bill my insurance or make self-payment for services provided; and responsibility for payment of non-covered services.     Mode of Communication:  Video Conference via [employer]    As the provider I attest to compliance with applicable laws and regulations related to telemedicine.    Summary of Group / Topics Discussed:  Mental Health Maintenance:  Stigma (GROUP 2 OF 2): In this group patients explored stigma surrounding a mental health diagnosis.  The group discussed the way stigma impacts their own life, and discussed strategies to reduce. The relationship between physical and mental health were also explored in the context of healthcare access, treatment, and support.    Patient Session Goals / Objectives:  ? Patients identified the importance of practicing emotional hygiene  ? Patients identified ways to decrease the  impact of stigma in their own life      Patient Participation / Response:  Fully participated with the group by sharing personal reflections / insights and openly received / provided feedback with other participants.    Demonstrated understanding of topics discussed through  group discussion and participation, Identified / Expressed personal readiness to practice skills and Verbalized understanding of mental health maintenance topic    Treatment Plan:  Patient has an initial individualized treatment plan that was created as part of their diagnostic assessment / admission process.  A master individualized treatment plan is in the process of being developed with the patient and multi-disciplinary care team.    Mel Jameson RN

## 2020-12-23 NOTE — GROUP NOTE
Psychoeducation Group Note    PATIENT'S NAME: Jennifer Cantu  MRN:   [id number]  :   1990  ACCT. NUMBER: [id number]  DATE OF SERVICE: 20  START TIME: 12:00 PM  END TIME: 12:50 PM  FACILITATOR: Mel Jameson RN  TOPIC: GUANAKO RN Group: Mental Health Maintenance  [facility] Adult Dual Diagnosis Day Treatment  TRACK: 3    NUMBER OF PARTICIPANTS: 3    Telemedicine Visit: The patient's condition can be safely assessed and treated via synchronous audio and visual telemedicine encounter.      Reason for Telemedicine Visit: Services only offered telehealth    Originating Site (Patient Location): Patient's home    Distant Site (Provider Location): Provider Remote Setting    Consent:  The patient/guardian has verbally consented to: the potential risks and benefits of telemedicine (video visit) versus in person care; bill my insurance or make self-payment for services provided; and responsibility for payment of non-covered services.     Mode of Communication:  Video Conference via [employer]    As the provider I attest to compliance with applicable laws and regulations related to telemedicine.    Summary of Group / Topics Discussed:  Mental Health Maintenance:  Stigma (GROUP 1 OF 2): In this group patients explored stigma surrounding a mental health diagnosis.  The group discussed the way stigma impacts their own life, and discussed strategies to reduce. The relationship between physical and mental health were also explored in the context of healthcare access, treatment, and support.    Patient Session Goals / Objectives:  ? Patients identified the importance of practicing emotional hygiene  ? Patients identified ways to decrease the  impact of stigma in their own life      Patient Participation / Response:  Fully participated with the group by sharing personal reflections / insights and openly received / provided feedback with other participants.    Demonstrated understanding of topics discussed through  group discussion and participation, Identified / Expressed personal readiness to practice skills and Verbalized understanding of mental health maintenance topic    Treatment Plan:  Patient has an initial individualized treatment plan that was created as part of their diagnostic assessment / admission process.  A master individualized treatment plan is in the process of being developed with the patient and multi-disciplinary care team.    Mel Jameson RN

## 2020-12-23 NOTE — GROUP NOTE
Process Group Note    PATIENT'S NAME: Jennifer Cantu  MRN:   [id number]  :   1990  ACCT. NUMBER: [id number]  DATE OF SERVICE: 20  START TIME: 11:00 AM  END TIME: 11:50 AM  FACILITATOR: Tonie Hassan LMFT  TOPIC:  Process Group    Diagnoses:  296.32 (F33.1) Major Depressive Disorder, Recurrent Episode, Moderate by history   Substance-Related & Addictive Disorders Alcohol Use Disorder   303.90 (F10.20) Severe     [facility] Adult Dual Diagnosis Day Treatment  TRACK: 3    NUMBER OF PARTICIPANTS: 2 Not billed    Telemedicine Visit: The patient's condition can be safely assessed and treated via synchronous audio and visual telemedicine encounter.      Reason for Telemedicine Visit: Services only offered telehealth    Originating Site (Patient Location): Patient's home    Distant Site (Provider Location): Provider Remote Setting    Consent:  The patient/guardian has verbally consented to: the potential risks and benefits of telemedicine (video visit) versus in person care; bill my insurance or make self-payment for services provided; and responsibility for payment of non-covered services.     Mode of Communication:  Video Conference via [employer]    As the provider I attest to compliance with applicable laws and regulations related to telemedicine.           Data:    Session content: At the start of this group, patients were invited to check in by identifying themselves, describing their current emotional status, and identifying issues to address in this group.   Area(s) of treatment focus addressed in this session included Symptom Management, Personal Safety and Abstinence/Relapse Prevention.    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 Treatment modalities used include Motivational Interviewing. Interventions include Relapse Prevention: Facilitated understanding the importance of awareness of factors that contribute to relapse  and Coached on skills to manage factors that contribute to relapse and Relationship Skills: Assisted patients in implementing more effective communication skills in their relationships and Discussed strategies to promote healthier understanding of interpersonal relationships.    Assessment:    Patient response:   Patient responded to session by accepting feedback, listening, being attentive and appearing alert    Possible barriers to participation / learning include: and no barriers identified    Health Issues:   None reported       Substance Use Review:   Substance Use: Last use: 12/22    Mental Status/Behavioral Observations  Appearance:   Appropriate   Eye Contact:   Good   Psychomotor Behavior: Restless   Attitude:   Cooperative   Orientation:   All  Speech   Rate / Production: Normal    Volume:  Normal   Mood:    Anxious  Depressed   Affect:    Blunted   Thought Content:   Clear and Safety denies any current safety concerns including suicidal ideation, self-harm, and homicidal ideation  Thought Form:  Coherent  Logical     Insight:    Fair     Plan:     Safety Plan: No current safety concerns identified.  Recommended that patient call 911 or go to the local ED should there be a change in any of these risk factors.     Barriers to treatment: None identified    Patient Contracts (see media tab):  None    Substance Use: Facilitated behavior chain analysis      Continue or Discharge: Patient will continue in Adult Dual Disorder Program (DDP) as planned. Patient is likely to benefit from learning and using skills as they work toward the goals identified in their treatment plan.      Anurag Hassan, LMFT  December 23, 2020

## 2020-12-28 ENCOUNTER — HOSPITAL ENCOUNTER (OUTPATIENT)
Dept: BEHAVIORAL HEALTH | Facility: CLINIC | Age: 30
End: 2020-12-28
Attending: PSYCHIATRY & NEUROLOGY
Payer: COMMERCIAL

## 2020-12-28 PROCEDURE — 90853 GROUP PSYCHOTHERAPY: CPT | Mod: 95 | Performed by: PSYCHOLOGIST

## 2020-12-28 NOTE — GROUP NOTE
Process Group Note    PATIENT'S NAME: Jennifer Cantu  MRN:   [id number]  :   1990  ACCT. NUMBER: [id number]  DATE OF SERVICE: 20  START TIME: 11:00 AM  END TIME: 11:50 AM  FACILITATOR: Nathaniel Ely LP  TOPIC:  Process Group    Diagnoses:  296.32 (F33.1) Major Depressive Disorder, Recurrent Episode, Moderate by history   Substance-Related & Addictive Disorders Alcohol Use Disorder   303.90 (F10.20) Severe        [facility] Adult Dual Diagnosis Day Treatment  TRACK: 3    NUMBER OF PARTICIPANTS: 4    Telemedicine Visit: The patient's condition can be safely assessed and treated via synchronous audio and visual telemedicine encounter.      Reason for Telemedicine Visit: Services only offered telehealth    Originating Site (Patient Location): Patient's home    Distant Site (Provider Location): Provider Remote Setting    Consent:  The patient/guardian has verbally consented to: the potential risks and benefits of telemedicine (video visit) versus in person care; bill my insurance or make self-payment for services provided; and responsibility for payment of non-covered services.     Mode of Communication:  Video Conference via [employer]    As the provider I attest to compliance with applicable laws and regulations related to telemedicine.            Data:    Session content: At the start of this group, patients were invited to check in by identifying themselves, describing their current emotional status, and identifying issues to address in this group.   Area(s) of treatment focus addressed in this session included Symptom Management, Personal Safety, Community Resources/Discharge Planning, Abstinence/Relapse Prevention, Develop / Improve Independent Living Skills and Develop Socialization / Interpersonal Relationship Skills.    Pt reports she drank yesterday-two beers-in response to stress. Pt reports she met her S/O's daughters and felt awkward and uncomfortable. She reports  one daughter was unfriendly toward her.     Pt reports she lacks social supports. Pt reports she is going back and forth between her home and her s/o's.     Therapeutic Interventions/Treatment Strategies:  Psychotherapist offered support, feedback and validation. Treatment modalities used include Cognitive Behavioral Therapy. Interventions include Relapse Prevention: Assisted patient in identifying personal vulnerabilities, thoughts, emotions, and situations that may lead to relapse  and Facilitated understanding of effective coping skills in response to triggers for substance use.    Assessment:    Patient response:   Patient responded to session by accepting feedback, giving feedback, listening, focusing on goals, being attentive, accepting support and appearing alert    Possible barriers to participation / learning include: and no barriers identified    Health Issues:   None reported       Substance Use Review:   Substance Use: alcohol .  and Last use: yesterday    Mental Status/Behavioral Observations  Appearance:   Appropriate   Eye Contact:   Good   Psychomotor Behavior: Normal   Attitude:   Cooperative   Orientation:   All  Speech   Rate / Production: Normal    Volume:  Normal   Mood:    Depressed   Affect:    Constricted   Thought Content:   Clear and Safety denies any current safety concerns including suicidal ideation, self-harm, and homicidal ideation  Thought Form:  Coherent  Logical     Insight:    Good     Plan:     Safety Plan: Recommended that patient call 911 or go to the local ED should there be a change in any of these risk factors.     Barriers to treatment: None identified    Patient Contracts (see media tab):  None    Substance Use: Stage of Change: Action     Continue or Discharge: Patient will continue in Adult Dual Disorder Program (DDP) as planned. Patient is likely to benefit from learning and using skills as they work toward the goals identified in their treatment plan.      Nathaniel Mercer  CECELIA Ely  December 28, 2020

## 2020-12-29 ENCOUNTER — HOSPITAL ENCOUNTER (OUTPATIENT)
Dept: BEHAVIORAL HEALTH | Facility: CLINIC | Age: 30
End: 2020-12-29
Attending: PSYCHIATRY & NEUROLOGY
Payer: COMMERCIAL

## 2020-12-29 PROCEDURE — 90853 GROUP PSYCHOTHERAPY: CPT | Mod: 95 | Performed by: PSYCHOLOGIST

## 2020-12-29 PROCEDURE — G0177 OPPS/PHP; TRAIN & EDUC SERV: HCPCS | Mod: 95 | Performed by: OCCUPATIONAL THERAPIST

## 2020-12-29 PROCEDURE — 90853 GROUP PSYCHOTHERAPY: CPT | Mod: GT | Performed by: PSYCHOLOGIST

## 2020-12-29 NOTE — GROUP NOTE
Process Group Note    PATIENT'S NAME: Jennifer Cantu  MRN:   [id number]  :   1990  ACCT. NUMBER: [id number]  DATE OF SERVICE: 20  START TIME: 12:00 PM  END TIME: 12:50 PM  FACILITATOR: Nathaniel Ely LP  TOPIC:  Process Group    Diagnoses:  296.32 (F33.1) Major Depressive Disorder, Recurrent Episode, Moderate by history   Substance-Related & Addictive Disorders Alcohol Use Disorder   303.90 (F10.20) Severe     [facility] Adult Dual Diagnosis Day Treatment  TRACK: 3    NUMBER OF PARTICIPANTS: 3    Telemedicine Visit: The patient's condition can be safely assessed and treated via synchronous audio and visual telemedicine encounter.      Reason for Telemedicine Visit: Services only offered telehealth    Originating Site (Patient Location): Patient's home    Distant Site (Provider Location): Provider Remote Setting    Consent:  The patient/guardian has verbally consented to: the potential risks and benefits of telemedicine (video visit) versus in person care; bill my insurance or make self-payment for services provided; and responsibility for payment of non-covered services.     Mode of Communication:  Video Conference via [employer]    As the provider I attest to compliance with applicable laws and regulations related to telemedicine.           Data:    Session content: At the start of this group, patients were invited to check in by identifying themselves, describing their current emotional status, and identifying issues to address in this group.   Area(s) of treatment focus addressed in this session included Symptom Management, Personal Safety, Community Resources/Discharge Planning, Abstinence/Relapse Prevention, Develop / Improve Independent Living Skills and Develop Socialization / Interpersonal Relationship Skills.    Pt reorts feeling relaxed due to being at home. She reports relationship stress. Pt reports she is going through her daughter's clothing and plans to get rid  of the things she has grown out of.     Therapeutic Interventions/Treatment Strategies:  Psychotherapist offered support, feedback and validation. Treatment modalities used include Cognitive Behavioral Therapy. Interventions include Behavioral Activation: Explored how behaviors effect mood and interact with thoughts and feelings.    Assessment:    Patient response:   Patient responded to session by accepting feedback, giving feedback, listening, focusing on goals, being attentive, accepting support and appearing alert    Possible barriers to participation / learning include: and no barriers identified    Health Issues:   None reported       Substance Use Review:   Substance Use: alcohol .     Mental Status/Behavioral Observations  Appearance:   Appropriate   Eye Contact:   Good   Psychomotor Behavior: Normal   Attitude:   Cooperative   Orientation:   All  Speech   Rate / Production: Normal    Volume:  Normal   Mood:    Normal  Affect:    Appropriate   Thought Content:   Clear  Thought Form:  Coherent  Logical     Insight:    Good     Plan:     Safety Plan: Recommended that patient call 911 or go to the local ED should there be a change in any of these risk factors.     Barriers to treatment: None identified    Patient Contracts (see media tab):  None    Substance Use: Stage of Change: Action     Continue or Discharge: Patient will continue in Adult Dual Disorder Program (DDP) as planned. Patient is likely to benefit from learning and using skills as they work toward the goals identified in their treatment plan.      Nathaniel Ely LP  December 29, 2020

## 2020-12-29 NOTE — GROUP NOTE
Psychoeducation Group Note    PATIENT'S NAME: Jennifer Cantu  MRN:   [id number]  :   1990  ACCT. NUMBER: [id number]  DATE OF SERVICE: 20  START TIME: 11:00 AM  END TIME: 11:50 AM  FACILITATOR: Jayashree Ling OTR/L  TOPIC: MH Life Skills Group: Lifestyle Balance and Structure  [facility] Adult Dual Diagnosis Day Treatment  TRACK: 3    NUMBER OF PARTICIPANTS: 4  Telemedicine Visit: The patient's condition can be safely assessed and treated via synchronous audio and visual telemedicine encounter.      Reason for Telemedicine Visit: Services only offered telehealth    Originating Site (Patient Location): Patient's home    Distant Site (Provider Location): [facility]: [facility]    Consent:  The patient/guardian has verbally consented to: the potential risks and benefits of telemedicine (video visit) versus in person care; bill my insurance or make self-payment for services provided; and responsibility for payment of non-covered services.     Mode of Communication:  Video Conference via [employer]    As the provider I attest to compliance with applicable laws and regulations related to telemedicine.      Summary of Group / Topics Discussed:  Lifestyle Balance and Strucure:  Time Management: Procrastination: Patients were taught and provided an opportunity to identify effective strategies to manage procrastination for more effective time management. Patients were taught strategies to promote improvement in performance of follow through with goals and daily tasks.    Patient Session Goals / Objectives:    Identified how procrastination is impacted by mental health symptoms and can lead to difficulty completing activities of daily living      Identified activities of daily living they are having difficulty engaging in and completing due to procrastination    Identified strategies to challenge procrastination to help improve follow through with activities of daily  activities        Patient Participation / Response:  Fully participated with the group by sharing personal reflections / insights and openly received / provided feedback with other participants.    Patient presentation: noted her challenges and some techniques that she has used in the past, open to learnnew ones and noted one she would like to practice at this time, Verbalized understanding of content and Patient would benefit from additional opportunities to practice the content to be able to generalize it to their everyday life with increased intentionality, consistency, and efficacy in support of their psychiatric recovery    Treatment Plan:  Patient has a current master individualized treatment plan.  See Epic treatment plan for more information.    Jayashree Ling, OTR/L

## 2020-12-29 NOTE — GROUP NOTE
Psychotherapy Group Note    PATIENT'S NAME: Jennifer Cantu  MRN:   [id number]  :   1990  ACCT. NUMBER: [id number]  DATE OF SERVICE: 20  START TIME:  1:00 PM  END TIME:  1:50 PM  FACILITATOR: Nathaniel Ely LP  TOPIC:  EBP Group: Behavioral Activation  [facility] Adult Dual Diagnosis Day Treatment  TRACK: 3    NUMBER OF PARTICIPANTS: 3    Telemedicine Visit: The patient's condition can be safely assessed and treated via synchronous audio and visual telemedicine encounter.      Reason for Telemedicine Visit: Services only offered telehealth    Originating Site (Patient Location): Patient's home    Distant Site (Provider Location): Provider Remote Setting    Consent:  The patient/guardian has verbally consented to: the potential risks and benefits of telemedicine (video visit) versus in person care; bill my insurance or make self-payment for services provided; and responsibility for payment of non-covered services.     Mode of Communication:  Video Conference via [employer]    As the provider I attest to compliance with applicable laws and regulations related to telemedicine.      Summary of Group / Topics Discussed:  Behavioral Activation: [place]: {Patients identified situations that prompt unwanted and unhelpful emotions / thoughts / behaviors.   Patients discussed how to problem solve by proactively using coping skills in potentially difficult situations. Components included describing the situation, brainstorming coping skills, imagining how scenario can/will unfold, rehearsing the action plan, and practicing relaxation to follow.  Patients practiced using these skills to reduce symptom distress and increase effective coping  behaviors.      Patient Session Goals / Objectives:    Identify difficult situation(s), and gain proficiency with alternative behaviors / skills to problem solve.    Increase confidence using coping skills through group practice in  session.    Receive and provide feedback regarding skill development.    Apply coping skills in daily life situations.      Patient Participation / Response:  Fully participated with the group by sharing personal reflections / insights and openly received / provided feedback with other participants.    Demonstrated understanding of topics discussed through group discussion and participation    Treatment Plan:  Patient has a current master individualized treatment plan.  See Epic treatment plan for more information.    Nathaniel Ely, LP

## 2020-12-31 ENCOUNTER — HOSPITAL ENCOUNTER (OUTPATIENT)
Dept: BEHAVIORAL HEALTH | Facility: CLINIC | Age: 30
End: 2020-12-31
Attending: PSYCHIATRY & NEUROLOGY
Payer: COMMERCIAL

## 2020-12-31 PROCEDURE — G0177 OPPS/PHP; TRAIN & EDUC SERV: HCPCS | Mod: GT | Performed by: OCCUPATIONAL THERAPIST

## 2020-12-31 NOTE — GROUP NOTE
Psychoeducation Group Note    PATIENT'S NAME: Jennifer Cantu  MRN:   [id number]  :   1990  ACCT. NUMBER: [id number]  DATE OF SERVICE: 20  START TIME:  1:00 PM  END TIME:  1:50 PM  FACILITATOR: Jayashree Ling OTR/L  TOPIC: MH Life Skills Group: Lifestyle Balance and Structure  [facility] Adult Dual Diagnosis Day Treatment  TRACK: 3    NUMBER OF PARTICIPANTS: 4  Telemedicine Visit: The patient's condition can be safely assessed and treated via synchronous audio and visual telemedicine encounter.      Reason for Telemedicine Visit: Services only offered telehealth    Originating Site (Patient Location): Patient's home    Distant Site (Provider Location): [facility]: [facility]    Consent:  The patient/guardian has verbally consented to: the potential risks and benefits of telemedicine (video visit) versus in person care; bill my insurance or make self-payment for services provided; and responsibility for payment of non-covered services.     Mode of Communication:  Video Conference via [employer]    As the provider I attest to compliance with applicable laws and regulations related to telemedicine.      Summary of Group / Topics Discussed:  Lifestyle Balance and Strucure:  Time Management: Time for Tips and Tips for Time: Patients were introduced to how effective time management is beneficial to self esteem, relationships with others, life balance, and other aspects of daily life.   Patients were taught strategies on how to improve time management skills.    Patient Session Goals / Objectives:    Facilitated the discussion on challenges/barriers and personal situations with time management     Identified specific techniques and strategies to improve time management to support mental health recovery       Identified a plan to implement strategies into daily life to support improved time management         Patient Participation / Response:  Fully participated with the group by  sharing personal reflections / insights and openly received / provided feedback with other participants.    Patient presentation: did note some challenges for herself and ways she is working to balance some areas of her life, Verbalized understanding of content and Patient would benefit from additional opportunities to practice the content to be able to generalize it to their everyday life with increased intentionality, consistency, and efficacy in support of their psychiatric recovery    Treatment Plan:  Patient has a current master individualized treatment plan.  See Epic treatment plan for more information.    Jayashree Ling, OTR/L

## 2021-01-04 ENCOUNTER — HOSPITAL ENCOUNTER (OUTPATIENT)
Dept: BEHAVIORAL HEALTH | Facility: CLINIC | Age: 31
End: 2021-01-04
Attending: PSYCHIATRY & NEUROLOGY
Payer: COMMERCIAL

## 2021-01-04 PROCEDURE — 90853 GROUP PSYCHOTHERAPY: CPT | Mod: GT | Performed by: PSYCHOLOGIST

## 2021-01-04 PROCEDURE — G0177 OPPS/PHP; TRAIN & EDUC SERV: HCPCS | Mod: GT | Performed by: OCCUPATIONAL THERAPIST

## 2021-01-04 PROCEDURE — G0177 OPPS/PHP; TRAIN & EDUC SERV: HCPCS | Mod: GT

## 2021-01-04 NOTE — GROUP NOTE
Process Group Note    PATIENT'S NAME: Jennifer Cantu  MRN:   [id number]  :   1990  ACCT. NUMBER: [id number]  DATE OF SERVICE: 21  START TIME: 11:00 AM  END TIME: 11:50 AM  FACILITATOR: Nathaniel Ely LP  TOPIC:  Process Group    Diagnoses:  296.32 (F33.1) Major Depressive Disorder, Recurrent Episode, Moderate by history   Substance-Related & Addictive Disorders Alcohol Use Disorder   303.90 (F10.20) Severe     [facility] Adult Dual Diagnosis Day Treatment  TRACK: 3    NUMBER OF PARTICIPANTS: 4    Telemedicine Visit: The patient's condition can be safely assessed and treated via synchronous audio and visual telemedicine encounter.      Reason for Telemedicine Visit: Services only offered telehealth    Originating Site (Patient Location): Patient's home    Distant Site (Provider Location): Provider Remote Setting    Consent:  The patient/guardian has verbally consented to: the potential risks and benefits of telemedicine (video visit) versus in person care; bill my insurance or make self-payment for services provided; and responsibility for payment of non-covered services.     Mode of Communication:  Video Conference via [employer]    As the provider I attest to compliance with applicable laws and regulations related to telemedicine.           Data:    Session content: At the start of this group, patients were invited to check in by identifying themselves, describing their current emotional status, and identifying issues to address in this group.   Area(s) of treatment focus addressed in this session included Symptom Management, Personal Safety, Community Resources/Discharge Planning, Abstinence/Relapse Prevention, Develop / Improve Independent Living Skills and Develop Socialization / Interpersonal Relationship Skills.    Pt reports she feels scattered and cannot make sense of things. She reports she is trying to go through her email and has difficulties finding the motivation.  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     Pt reports she drank a bottle of champagne over the weekend that she forgot she had in the fridge.     Pt reports she is proud she got her daughter up and ready for school today.     Therapeutic Interventions/Treatment Strategies:  Psychotherapist offered support, feedback and validation. Treatment modalities used include Motivational Interviewing and Cognitive Behavioral Therapy. Interventions include Relapse Prevention: Facilitated understanding the importance of awareness of factors that contribute to relapse  and Coached on skills to manage factors that contribute to relapse and Relationship Skills: Discussed strategies to promote healthier understanding of interpersonal relationships.    Assessment:    Patient response:   Patient responded to session by accepting feedback, giving feedback, listening, focusing on goals, being attentive, accepting support and appearing alert    Possible barriers to participation / learning include: and no barriers identified    Health Issues:   Yes: Cold, Associated Psychological Distress       Substance Use Review:   Substance Use: alcohol .     Mental Status/Behavioral Observations  Appearance:   Appropriate   Eye Contact:   Good   Psychomotor Behavior: Normal   Attitude:   Cooperative   Orientation:   All  Speech   Rate / Production: Normal    Volume:  Normal   Mood:    Depressed   Affect:    Constricted   Thought Content:   Clear and Safety denies any current safety concerns including suicidal ideation, self-harm, and homicidal ideation  Thought Form:  Coherent  Logical     Insight:    Fair     Plan:     Safety Plan: Recommended that patient call 911 or go to the local ED should there be a change in any of these risk factors.     Barriers to treatment: None identified    Patient Contracts (see media tab):  None    Substance Use: " Stage of Change: Contemplation     Continue or Discharge: Patient will continue in Adult Dual Disorder Program (DDP) as planned. Patient is likely to benefit from learning and using skills as they work toward the goals identified in their treatment plan.      Nathaniel Ely LP  January 4, 2021

## 2021-01-04 NOTE — GROUP NOTE
Psychoeducation Group Note    PATIENT'S NAME: Jennifer Cantu  MRN:   [id number]  :   1990  ACCT. NUMBER: [id number]  DATE OF SERVICE: 21  START TIME:  1:00 PM  END TIME:  1:50 PM  FACILITATOR: Mel Jameson RN  TOPIC: GUANAKO RN Group: Brain Health  [facility] Adult Dual Diagnosis Day Treatment  TRACK: 3    NUMBER OF PARTICIPANTS: 4    Telemedicine Visit: The patient's condition can be safely assessed and treated via synchronous audio and visual telemedicine encounter.      Reason for Telemedicine Visit: Services only offered telehealth    Originating Site (Patient Location): Patient's home    Distant Site (Provider Location): Provider Remote Setting    Consent:  The patient/guardian has verbally consented to: the potential risks and benefits of telemedicine (video visit) versus in person care; bill my insurance or make self-payment for services provided; and responsibility for payment of non-covered services.     Mode of Communication:  Video Conference via [employer]    As the provider I attest to compliance with applicable laws and regulations related to telemedicine.    Summary of Group / Topics Discussed:  Brain Health:  Pathophysiology of stress and anxiety: Patients were educated on the difference between stress, chronic stress, and anxiety. The anatomy and pathophysiology of the body/brain were reviewed to illustrate the immediate effects of stress/anxiety in the body and the long term effects and increased risks for chronic disease that come from unmanaged stress/anxiety. Self-coping strategies to manage symptoms of stress were reviewed and pharmacologic, psychotherapeutic, and complementary treatment options were discussed.    Patient Session Goals / Objectives:  ? Described the differences between stress and anxiety and how the body responds to it  ? Listed the long term effects and increased risks for chronic disease that can arise from unmanaged stress/anxiety  ? Identified and  described pharmacologic, psychotherapeutic, and complementary treatment options      Patient Participation / Response:  Fully participated with the group by sharing personal reflections / insights and openly received / provided feedback with other participants.    Demonstrated understanding of topics discussed through group discussion and participation, Identified / Expressed personal readiness to practice skills and Verbalized understanding of brain health topic    Treatment Plan:  Patient has a current master individualized treatment plan.  See Epic treatment plan for more information.    Mel Jameson RN

## 2021-01-04 NOTE — GROUP NOTE
Psychoeducation Group Note    PATIENT'S NAME: Jennifer Cantu  MRN:   [id number]  :   1990  ACCT. NUMBER: [id number]  DATE OF SERVICE: 21  START TIME: 12:00 PM  END TIME: 12:50 PM  FACILITATOR: Jayashree Ling OTR/L  TOPIC: MH Life Skills Group: Lifestyle Balance and Structure  [facility] Adult Dual Diagnosis Day Treatment  TRACK: 3    NUMBER OF PARTICIPANTS: 4  Telemedicine Visit: The patient's condition can be safely assessed and treated via synchronous audio and visual telemedicine encounter.      Reason for Telemedicine Visit: Services only offered telehealth    Originating Site (Patient Location): Patient's home    Distant Site (Provider Location): [facility]: [facility]    Consent:  The patient/guardian has verbally consented to: the potential risks and benefits of telemedicine (video visit) versus in person care; bill my insurance or make self-payment for services provided; and responsibility for payment of non-covered services.     Mode of Communication:  Video Conference via [employer]    As the provider I attest to compliance with applicable laws and regulations related to telemedicine.      Summary of Group / Topics Discussed:  Lifestyle Balance and Strucure:  Goal-setting & integration: Patients were introduced to the process and benefits of setting realistic, timely, and achievable goals to help support their ability to follow through with meaningful personal, health, recovery and treatment goals that they would like to achieve to improve overall functioning.  Patients were also taught and then practiced techniques to manage a variety of obstacles to achieve their goals and how to break goals into manageable pieces.  Patients also reported on follow through of goals.     Patient Session Goals / Objectives:    Facilitated the creation of a vision for recovery and wellbeing    Identified and wrote meaningful SMART goals to engage in meaningful activities of daily  living     Identified and problem solved barriers to achieving goals     Identified plan to support follow through on goals and reflection on progress made          Patient Participation / Response:  Fully participated with the group by sharing personal reflections / insights and openly received / provided feedback with other participants.    Patient presentation: able to do positive reflections and focus for treatment on how to stay sober, did note she has decreased her drinking and has a goal to be sober at this time, open to engage in behavior chain and further processing, Verbalized understanding of content and Patient would benefit from additional opportunities to practice the content to be able to generalize it to their everyday life with increased intentionality, consistency, and efficacy in support of their psychiatric recovery    Treatment Plan:  Patient has a current master individualized treatment plan.  See Epic treatment plan for more information.    Jayashree Ling OTR/L

## 2021-01-04 NOTE — ADDENDUM NOTE
Encounter addended by: Nathaniel Ely LP on: 1/4/2021 2:14 PM   Actions taken: Charge Capture section accepted

## 2021-01-05 ENCOUNTER — HOSPITAL ENCOUNTER (OUTPATIENT)
Dept: BEHAVIORAL HEALTH | Facility: CLINIC | Age: 31
End: 2021-01-05
Attending: PSYCHIATRY & NEUROLOGY
Payer: COMMERCIAL

## 2021-01-05 PROCEDURE — 90853 GROUP PSYCHOTHERAPY: CPT | Mod: GT | Performed by: PSYCHOLOGIST

## 2021-01-05 NOTE — GROUP NOTE
Process Group Note    PATIENT'S NAME: Jennifer Cantu  MRN:   [id number]  :   1990  ACCT. NUMBER: [id number]  DATE OF SERVICE: 21  START TIME: 12:00 PM  END TIME: 12:50 PM  FACILITATOR: Nathaniel Ely LP  TOPIC:  Process Group    Diagnoses:  296.32 (F33.1) Major Depressive Disorder, Recurrent Episode, Moderate by history   Substance-Related & Addictive Disorders Alcohol Use Disorder   303.90 (F10.20) Severe        [facility] Adult Dual Diagnosis Day Treatment  TRACK: 3    NUMBER OF PARTICIPANTS: 3    Telemedicine Visit: The patient's condition can be safely assessed and treated via synchronous audio and visual telemedicine encounter.      Reason for Telemedicine Visit: Services only offered telehealth    Originating Site (Patient Location): Patient's home    Distant Site (Provider Location): Provider Remote Setting    Consent:  The patient/guardian has verbally consented to: the potential risks and benefits of telemedicine (video visit) versus in person care; bill my insurance or make self-payment for services provided; and responsibility for payment of non-covered services.     Mode of Communication:  Video Conference via [employer]    As the provider I attest to compliance with applicable laws and regulations related to telemedicine.            Data:    Session content: At the start of this group, patients were invited to check in by identifying themselves, describing their current emotional status, and identifying issues to address in this group.   Area(s) of treatment focus addressed in this session included Symptom Management, Personal Safety, Community Resources/Discharge Planning, Abstinence/Relapse Prevention, Develop / Improve Independent Living Skills and Develop Socialization / Interpersonal Relationship Skills.    Pt reports feeling content today. She reports she has a goal to finish going through her email today. She reports she has a drive to do that she is wary  of doing. Pt reports she has urges to use and had a thought to take a beer out of the mini fridge her S/O has.     Therapeutic Interventions/Treatment Strategies:  Psychotherapist offered support, feedback and validation. Treatment modalities used include Motivational Interviewing and Cognitive Behavioral Therapy. Interventions include Cognitive Restructuring:  Assisted patient in formulating new neutral/positive alternatives to challenge less helpful / ineffective thoughts and Relapse Prevention: Facilitated understanding the importance of awareness of factors that contribute to relapse  and Assisted patient in identifying personal vulnerabilities, thoughts, emotions, and situations that may lead to relapse .    Assessment:    Patient response:   Patient responded to session by accepting feedback, giving feedback, listening, focusing on goals, being attentive, accepting support and appearing alert    Possible barriers to participation / learning include: and no barriers identified    Health Issues:   None reported       Substance Use Review:   Substance Use: alcohol .     Mental Status/Behavioral Observations  Appearance:   Appropriate   Eye Contact:   Good   Psychomotor Behavior: Normal   Attitude:   Cooperative   Orientation:   All  Speech   Rate / Production: Normal    Volume:  Normal   Mood:    Depressed   Affect:    Constricted   Thought Content:   Clear and Safety denies any current safety concerns including suicidal ideation, self-harm, and homicidal ideation  Thought Form:  Coherent  Logical     Insight:    Good     Plan:     Safety Plan: Recommended that patient call 911 or go to the local ED should there be a change in any of these risk factors.     Barriers to treatment: None identified    Patient Contracts (see media tab):  None    Substance Use: Stage of Change: Action     Continue or Discharge: Patient will continue in Adult Dual Disorder Program (DDP) as planned. Patient is likely to benefit from  learning and using skills as they work toward the goals identified in their treatment plan.      Nathaniel Ely, CECELIA  January 5, 2021

## 2021-01-05 NOTE — GROUP NOTE
Psychotherapy Group Note    PATIENT'S NAME: Jennifer Cantu  MRN:   [id number]  :   1990  ACCT. NUMBER: [id number]  DATE OF SERVICE: 21  START TIME:  1:00 PM  END TIME:  1:50 PM  FACILITATOR: Nathaniel Ely LP  TOPIC:  EBP Group: DDP Relapse Prevention  [facility] Adult Dual Diagnosis Day Treatment  TRACK: 3    NUMBER OF PARTICIPANTS: 3    Telemedicine Visit: The patient's condition can be safely assessed and treated via synchronous audio and visual telemedicine encounter.      Reason for Telemedicine Visit: Services only offered telehealth    Originating Site (Patient Location): Patient's home    Distant Site (Provider Location): Provider Remote Setting    Consent:  The patient/guardian has verbally consented to: the potential risks and benefits of telemedicine (video visit) versus in person care; bill my insurance or make self-payment for services provided; and responsibility for payment of non-covered services.     Mode of Communication:  Video Conference via [employer]    As the provider I attest to compliance with applicable laws and regulations related to telemedicine.      Summary of Group / Topics Discussed:  DDP Relapse Prevention: Behavior Chain Analysis: Patients explored the steps leading up to substance use including assessing patterns, triggers, and ineffective behaviors. Patients learned how to replace substance with more effective behaviors and interrupt process of relapse. Patients examined factors contributing to unwanted behaviors, with a goal of determining ways to interrupt the dysfunctional pattern.  Patients discussed the relationship between thoughts, feelings, and behaviors with a focus on unwanted behaviors. Patients also discussed the process of change and benefits of identifying and tracking unwanted behaviors and associated thoughts and feelings.     Patient Session Goals / Objectives:    Identified thoughts, feelings, and actions that contribute to  unwanted behaviors    Identified thoughts, feelings, and actions that contribute to more effective behaviors    Made plans to track and implement changes and share experiences in group    Identified personal barriers to change        Patient Participation / Response:  Fully participated with the group by sharing personal reflections / insights and openly received / provided feedback with other participants.    Demonstrated understanding of topics discussed through group discussion and participation    Treatment Plan:  Patient has a current master individualized treatment plan.  See Epic treatment plan for more information.    Nathaniel Ely, LP

## 2021-01-05 NOTE — GROUP NOTE
Psychoeducation Group Note    PATIENT'S NAME: Jennifer Cantu  MRN:   [id number]  :   1990  ACCT. NUMBER: [id number]  DATE OF SERVICE: 21  START TIME: 11:00 AM  END TIME: 11:50 AM  FACILITATOR: Jayashree Ling OTR/L  TOPIC: MH Life Skills Group: Sensory Approaches in Mental Health  [facility] Adult Dual Diagnosis Day Treatment  TRACK: 3    NUMBER OF PARTICIPANTS: 2 - 1 additional joined at beginning for brief time and then left   Telemedicine Visit: The patient's condition can be safely assessed and treated via synchronous audio and visual telemedicine encounter.      Reason for Telemedicine Visit: Services only offered telehealth    Originating Site (Patient Location): Patient's home    Distant Site (Provider Location): [facility]: [facility]    Consent:  The patient/guardian has verbally consented to: the potential risks and benefits of telemedicine (video visit) versus in person care; bill my insurance or make self-payment for services provided; and responsibility for payment of non-covered services.     Mode of Communication:  Video Conference via [employer]    As the provider I attest to compliance with applicable laws and regulations related to telemedicine.      Summary of Group / Topics Discussed:  Sensory Approaches in Mental Health:  Self Regulation Through Sensory Modulation:  Patients were provided with education on Autonomic Nervous System activation and taught skills that can be used immediately to help them calm down and regain self control.  Patients were taught how to recognize specific signs and symptoms of their individualized state of arousal and how to make changes when needed.  Patients explored body based skills (bottom up processing skills) and techniques to help manage symptoms and change level of arousal when needed to be in control and comfortable so they are able to function in different environments.       Patient Session Goals /  Objectives:    Identified specific and individualized neurosensory skills to help when distressed and for crisis management    Identified signs and symptoms of current level of arousal and ways to make changes in level of arousal when needed    Established a plan for practice of these skills in their own environments        Patient Participation / Response:  Fully participated with the group by sharing personal reflections / insights and openly received / provided feedback with other participants.    Patient presentation: able to note some signs and triggers for herself, open to explore more and identify some grounding techniqus, Verbalized understanding of content and Patient would benefit from additional opportunities to practice the content to be able to generalize it to their everyday life with increased intentionality, consistency, and efficacy in support of their psychiatric recovery    Treatment Plan:  Patient has a current master individualized treatment plan.  See Epic treatment plan for more information.    Jayashree Ling OTR/L

## 2021-01-06 ENCOUNTER — HOSPITAL ENCOUNTER (OUTPATIENT)
Dept: BEHAVIORAL HEALTH | Facility: CLINIC | Age: 31
End: 2021-01-06
Attending: PSYCHIATRY & NEUROLOGY
Payer: COMMERCIAL

## 2021-01-06 PROCEDURE — G0177 OPPS/PHP; TRAIN & EDUC SERV: HCPCS | Mod: 95

## 2021-01-06 PROCEDURE — 90853 GROUP PSYCHOTHERAPY: CPT | Mod: 95 | Performed by: PSYCHOLOGIST

## 2021-01-06 NOTE — GROUP NOTE
Psychoeducation Group Note    PATIENT'S NAME: Jennifer Cantu  MRN:   [id number]  :   1990  ACCT. NUMBER: [id number]  DATE OF SERVICE: 21  START TIME: 12:00 PM  END TIME: 12:50 PM  FACILITATOR: Mel Jameson RN  TOPIC: GUANAKO RN Group: [facility] Adult Dual Diagnosis Day Treatment  TRACK: 3    NUMBER OF PARTICIPANTS: 4    Telemedicine Visit: The patient's condition can be safely assessed and treated via synchronous audio and visual telemedicine encounter.      Reason for Telemedicine Visit: Services only offered telehealth    Originating Site (Patient Location): Patient's home    Distant Site (Provider Location): Provider Remote Setting    Consent:  The patient/guardian has verbally consented to: the potential risks and benefits of telemedicine (video visit) versus in person care; bill my insurance or make self-payment for services provided; and responsibility for payment of non-covered services.     Mode of Communication:  Video Conference via [employer]    As the provider I attest to compliance with applicable laws and regulations related to telemedicine.    Summary of Group / Topics Discussed:  Foundations of Health: Exercise: Why exercise: Patients evaluated and discussed their current exercise behaviors/physical activity routine and identified barriers/obstacles to meeting daily physical activity recommendations. Patients were educated on the four types of exercise: endurance, strength, balance, and flexibility. Patients were educated on the benefits of getting daily physical activity, safety tips for beginning an exercise program, and fitness goal setting strategies.     Patient Session Goals / Objectives:  ? Explained the emotional and physical benefits of exercise  ? Identified how exercise and activity affects bodily function  ? Listed ways to incorporate exercise into daily routine      Patient Participation / Response:  Fully participated with the group by sharing  personal reflections / insights and openly received / provided feedback with other participants.    Demonstrated understanding of topics discussed through group discussion and participation, Identified / Expressed personal readiness to practice skills and Verbalized understanding of foundations of health topic    Treatment Plan:  Patient has a current master individualized treatment plan.  See Epic treatment plan for more information.    Mel Jameson RN

## 2021-01-06 NOTE — GROUP NOTE
Psychotherapy Group Note    PATIENT'S NAME: Jennifer Cantu  MRN:   [id number]  :   1990  ACCT. NUMBER: [id number]  DATE OF SERVICE: 21  START TIME:  1:00 PM  END TIME:  1:50 PM  FACILITATOR: Nathaniel Ely LP  TOPIC:  EBP Group: Emotions Management  [facility] Adult Dual Diagnosis Day Treatment  TRACK: 3    NUMBER OF PARTICIPANTS: 4    Telemedicine Visit: The patient's condition can be safely assessed and treated via synchronous audio and visual telemedicine encounter.      Reason for Telemedicine Visit: Services only offered telehealth    Originating Site (Patient Location): Patient's home    Distant Site (Provider Location): Provider Remote Setting    Consent:  The patient/guardian has verbally consented to: the potential risks and benefits of telemedicine (video visit) versus in person care; bill my insurance or make self-payment for services provided; and responsibility for payment of non-covered services.     Mode of Communication:  Video Conference via [employer]    As the provider I attest to compliance with applicable laws and regulations related to telemedicine.     Summary of Group / Topics Discussed:  Emotions Management: Understanding Emotions: Patients discussed the purpose of emotions and function they serve in our lives.  Reviewed core emotions, why they happen (triggers), and how they occur. The group assisted one anothers' understanding that: emotional experiences are important; difficult emotions have a place in our lives; and the differences between various emotions.    Patient Session Goals / Objectives:    Demonstrate understanding of types various emotions    Identify and discuss specific emotions and when they occur; understand triggers    Discuss barriers to emotional regulation      Patient Participation / Response:  Fully participated with the group by sharing personal reflections / insights and openly received / provided feedback with other  participants.    Demonstrated understanding of topics discussed through group discussion and participation    Treatment Plan:  Patient has a current master individualized treatment plan.  See Epic treatment plan for more information.    Nathaniel Ely, LP

## 2021-01-06 NOTE — GROUP NOTE
Process Group Note    PATIENT'S NAME: Jennifer Cantu  MRN:   [id number]  :   1990  ACCT. NUMBER: [id number]  DATE OF SERVICE: 21  START TIME: 11:00 AM  END TIME: 11:50 AM  FACILITATOR: Nathaniel Ely LP  TOPIC:  Process Group    Diagnoses:  296.32 (F33.1) Major Depressive Disorder, Recurrent Episode, Moderate by history   Substance-Related & Addictive Disorders Alcohol Use Disorder   303.90 (F10.20) Severe        [facility] Adult Dual Diagnosis Day Treatment  TRACK: 3    NUMBER OF PARTICIPANTS: 4    Telemedicine Visit: The patient's condition can be safely assessed and treated via synchronous audio and visual telemedicine encounter.      Reason for Telemedicine Visit: Services only offered telehealth    Originating Site (Patient Location): Patient's home    Distant Site (Provider Location): Provider Remote Setting    Consent:  The patient/guardian has verbally consented to: the potential risks and benefits of telemedicine (video visit) versus in person care; bill my insurance or make self-payment for services provided; and responsibility for payment of non-covered services.     Mode of Communication:  Video Conference via [employer]    As the provider I attest to compliance with applicable laws and regulations related to telemedicine.            Data:    Session content: At the start of this group, patients were invited to check in by identifying themselves, describing their current emotional status, and identifying issues to address in this group.   Area(s) of treatment focus addressed in this session included Symptom Management, Personal Safety, Community Resources/Discharge Planning, Abstinence/Relapse Prevention, Develop / Improve Independent Living Skills and Develop Socialization / Interpersonal Relationship Skills.    Pt reports she feels tired. She reports she is making progress on her home getting ready to move.     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 She reports alcohol is part of the usual meeting.     Pt reports she is taking meds and has not used alcohol in several days (since the weekend).     Pt reports she reached out to her grandmother for support.     Therapeutic Interventions/Treatment Strategies:  Psychotherapist offered support, feedback and validation. Treatment modalities used include Motivational Interviewing and Cognitive Behavioral Therapy. Interventions include Coping Skills: Promoted understanding of how and when to apply grounding strategies to reduce distress and increase presence in the moment and Relapse Prevention: Facilitated understanding the importance of awareness of factors that contribute to relapse  and Assisted patient in identifying personal vulnerabilities, thoughts, emotions, and situations that may lead to relapse .    Assessment:    Patient response:   Patient responded to session by accepting feedback, giving feedback, listening, focusing on goals, being attentive, accepting support and appearing alert    Possible barriers to participation / learning include: substance use    Health Issues:   None reported       Substance Use Review:   Substance Use: alcohol .     Mental Status/Behavioral Observations  Appearance:   Appropriate   Eye Contact:   Good   Psychomotor Behavior: Normal   Attitude:   Cooperative   Orientation:   All  Speech   Rate / Production: Normal    Volume:  Normal   Mood:    Anxious  Depressed   Affect:    Appropriate   Thought Content:   Clear and Safety denies any current safety concerns including suicidal ideation, self-harm, and homicidal ideation  Thought Form:  Coherent  Logical     Insight:    Good     Plan:     Safety Plan: Recommended that patient call 911 or go to the local ED should there be a change in any of these risk factors.     Barriers to treatment: substance use    Patient Contracts (see media tab):  None    Substance Use:  Stage of Change: Contemplation     Continue or Discharge: Patient will continue in Adult Dual Disorder Program (DDP) as planned. Patient is likely to benefit from learning and using skills as they work toward the goals identified in their treatment plan.      Nathaniel Ely LP  January 6, 2021

## 2021-01-08 ENCOUNTER — HOSPITAL ENCOUNTER (OUTPATIENT)
Dept: BEHAVIORAL HEALTH | Facility: CLINIC | Age: 31
End: 2021-01-08
Attending: PSYCHIATRY & NEUROLOGY
Payer: COMMERCIAL

## 2021-01-08 PROCEDURE — G0177 OPPS/PHP; TRAIN & EDUC SERV: HCPCS | Mod: GT | Performed by: OCCUPATIONAL THERAPIST

## 2021-01-08 PROCEDURE — 90853 GROUP PSYCHOTHERAPY: CPT | Mod: 95

## 2021-01-08 NOTE — GROUP NOTE
Psychoeducation Group Note    PATIENT'S NAME: Jennifer Cantu  MRN:   [id number]  :   1990  ACCT. NUMBER: [id number]  DATE OF SERVICE: 21  START TIME:  1:00 PM  END TIME:  1:50 PM  FACILITATOR: Jayashree Ling OTR/L  TOPIC: MH Life Skills Group: Sensory Approaches in Mental Health  [facility] Adult Dual Diagnosis Day Treatment  TRACK: 3    NUMBER OF PARTICIPANTS: 4  Telemedicine Visit: The patient's condition can be safely assessed and treated via synchronous audio and visual telemedicine encounter.      Reason for Telemedicine Visit: Services only offered telehealth    Originating Site (Patient Location): Patient's home    Distant Site (Provider Location): [facility]: [facility]    Consent:  The patient/guardian has verbally consented to: the potential risks and benefits of telemedicine (video visit) versus in person care; bill my insurance or make self-payment for services provided; and responsibility for payment of non-covered services.     Mode of Communication:  Video Conference via [employer]    As the provider I attest to compliance with applicable laws and regulations related to telemedicine.      Summary of Group / Topics Discussed:  Sensory Approaches in Mental Health:  Sensory Enhanced Mindfulness: Patients were taught and provided with an opportunity to explore and practice how using sensory enhanced mindfulness practices can help them stay grounded in the present moment as a way to manage mental health symptoms and stressors.         Patient Session Goals / Objectives:    Identified how using sensory enhanced mindfulness practices can be used for grounding, stress management, and self regulation      Improved awareness of different types of sensory enhanced mindfulness activities that assist with healthy coping of stress and symptoms      Established a plan for practice of these skills in their own environments    Practiced and reflected on how to generalize  taught skills to their everyday life        Patient Participation / Response:  Fully participated with the group by sharing personal reflections / insights and openly received / provided feedback with other participants.    Patient presentation: engaged in the practice, noting similar techniques tried in the past and benefits, Verbalized understanding of content and Patient would benefit from additional opportunities to practice the content to be able to generalize it to their everyday life with increased intentionality, consistency, and efficacy in support of their psychiatric recovery    Treatment Plan:  Patient has a current master individualized treatment plan.  See Epic treatment plan for more information.    Jayashree Ling, OTR/L

## 2021-01-08 NOTE — GROUP NOTE
Psychoeducation Group Note    PATIENT'S NAME: Jennifer Cantu  MRN:   [id number]  :   1990  ACCT. NUMBER: [id number]  DATE OF SERVICE: 21  START TIME: 12:00 PM  END TIME: 12:50 PM  FACILITATOR: Jayashree Ling OTR/L  TOPIC: MH Life Skills Group: Sensory Approaches in Mental Health  [facility] Adult Dual Diagnosis Day Treatment  TRACK: 3    NUMBER OF PARTICIPANTS: 4  Telemedicine Visit: The patient's condition can be safely assessed and treated via synchronous audio and visual telemedicine encounter.      Reason for Telemedicine Visit: Services only offered telehealth    Originating Site (Patient Location): Patient's home    Distant Site (Provider Location): [facility]: [facility]    Consent:  The patient/guardian has verbally consented to: the potential risks and benefits of telemedicine (video visit) versus in person care; bill my insurance or make self-payment for services provided; and responsibility for payment of non-covered services.     Mode of Communication:  Video Conference via [employer]    As the provider I attest to compliance with applicable laws and regulations related to telemedicine.      Summary of Group / Topics Discussed:  Sensory Approaches in Mental Health:  Self Regulation Through Sensory Modulation:  Patients were provided with education on Autonomic Nervous System activation and taught skills that can be used immediately to help them calm down and regain self control.  Patients were taught how to recognize specific signs and symptoms of their individualized state of arousal and how to make changes when needed.  Patients explored body based skills (bottom up processing skills) and techniques to help manage symptoms and change level of arousal when needed to be in control and comfortable so they are able to function in different environments.       Patient Session Goals / Objectives:    Identified specific and individualized neurosensory skills to  help when distressed and for crisis management    Identified signs and symptoms of current level of arousal and ways to make changes in level of arousal when needed    Established a plan for practice of these skills in their own environments        Patient Participation / Response:  Fully participated with the group by sharing personal reflections / insights and openly received / provided feedback with other participants.    Patient presentation: able to note some areas where she does practice good management for self regulation and noted areas and techniques to try to practice, Verbalized understanding of content and Patient would benefit from additional opportunities to practice the content to be able to generalize it to their everyday life with increased intentionality, consistency, and efficacy in support of their psychiatric recovery    Treatment Plan:  Patient has a current master individualized treatment plan.  See Epic treatment plan for more information.    Jayashree Ling, OTR/L

## 2021-01-08 NOTE — GROUP NOTE
Process Group Note    PATIENT'S NAME: Jennifer Cantu  MRN:   [id number]  :   1990  ACCT. NUMBER: [id number]  DATE OF SERVICE: 21  START TIME: 11:00 AM  END TIME: 11:50 AM  FACILITATOR: Emerita Urbina LICSW  TOPIC:  Process Group    Diagnoses:  296.32 (F33.1) Major Depressive Disorder, Recurrent Episode, Moderate by history   Substance-Related & Addictive Disorders Alcohol Use Disorder   303.90 (F10.20) Severe       [facility] Adult Dual Diagnosis Day Treatment  TRACK: 3    Telemedicine Visit: The patient's condition can be safely assessed and treated via synchronous audio and visual telemedicine encounter.      Reason for Telemedicine Visit: Services only offered telehealth    Originating Site (Patient Location): Patient's home    Distant Site (Provider Location): [facility]: [facility]    Consent:  The patient/guardian has verbally consented to: the potential risks and benefits of telemedicine (video visit) versus in person care; bill my insurance or make self-payment for services provided; and responsibility for payment of non-covered services.     Mode of Communication:  Video Conference via [employer]    As the provider I attest to compliance with applicable laws and regulations related to telemedicine.      NUMBER OF PARTICIPANTS: 4          Data:    Session content: At the start of this group, patients were invited to check in by identifying themselves, describing their current emotional status, and identifying issues to address in this group.   Area(s) of treatment focus addressed in this session included Symptom Management and Personal Safety.  Pt reports feeling sick with respiratory symptoms for two weeks which is affecting her mood. She has not seen a dr or had a covid test, but will consider doing this. 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 Pt used alcohol Wed and will likely feel tempted to use tonight. Discussed  "possible strategies to avoid this. Pt talks about her plan to \"leave the industry\" and is looking forward to moving to [place] to live with her boyfriend. Describes boyfriend as supportive and his home as \"peaceful\". He will spend the weekend with her this weekend. Denies safety concerns.    Therapeutic Interventions/Treatment Strategies:  Psychotherapist offered support, feedback and validation and reinforced use of skills. Treatment modalities used include Cognitive Behavioral Therapy. Interventions include Coping Skills: Discussed use of self-soothe skills to decrease distress in the body and Assisted patient in identifying 1-2 healthy distraction skills to reduce overall distress, Relapse Prevention: Coached on skills to manage factors that contribute to relapse and Facilitated understanding of effective coping skills in response to triggers for substance use and Relationship Skills: discussed ways to be assertive.    Assessment:    Patient response:   Patient responded to session by accepting feedback, listening, focusing on goals, being attentive and accepting support    Possible barriers to participation / learning include: severity of symptoms    Health Issues:   Yes: Pain, Associated Psychological Distress       Substance Use Review:   Substance Use: alcohol .  and Last use: 1/6    Mental Status/Behavioral Observations  Appearance:   pajamas   Eye Contact:   Good   Psychomotor Behavior: slowed   Attitude:   Cooperative   Orientation:   All  Speech   Rate / Production: Normal    Volume:  Normal   Mood:    Anxious  Depressed   Affect:    Subdued  Worrisome   Thought Content:   Clear  Thought Form:  Coherent  Logical     Insight:    Fair     Plan:     Safety Plan: No current safety concerns identified.  Recommended that patient call 911 or go to the local ED should there be a change in any of these risk factors.     Barriers to treatment: None identified    Patient Contracts (see media tab):  None    Substance Use: " Provided encouragement towards sobriety     Continue or Discharge: Patient will continue in Adult Dual Disorder Program (DDP) as planned. Patient is likely to benefit from learning and using skills as they work toward the goals identified in their treatment plan.      Emerita Encinas, [facility]  January 8, 2021

## 2021-01-12 ENCOUNTER — TELEPHONE (OUTPATIENT)
Dept: BEHAVIORAL HEALTH | Facility: CLINIC | Age: 31
End: 2021-01-12

## 2021-01-13 NOTE — DISCHARGE SUMMARY
Adult Dual Disorder Program   Discharge Summary/Instructions     Patient: Jennifer Cantu MRN: [id number]  : 1990 Age:  30 year old Sex:  female    Admission Date: 12/15/20  Discharge Date: 21  Diagnosis: 296.32 (F33.1) Major Depressive Disorder, Recurrent Episode, Moderate by history   Substance-Related & Addictive Disorders Alcohol Use Disorder   303.90 (F10.20) Severe   4. Other Diagnoses that is relevant to services: Bipolar Disorder, per history per patient's report.    301.83 (F60.3) Borderline Personality Disorder per history        Focus of Treatment / Discharge Recommendations: RESIDENTIAL TREATMENT    Personal Safety/ Management of Symptoms:    * Follow your safety plan.  Report increased symptoms to your care team                    and/or use the crisis resources listed below.    Crisis Resources:    Suicide Prevention Lifeline: 8-730-976-YUAP (6325)    Crisis Text Line Service (available 24 hours a day, 7 days a week): Text [place] to 972029    Call  **CRISIS (001242) from a cell phone to talk to a team of professionals who can help you.  Crisis Services By [facility]: Phone Number:   [place]     [phone]   [place]    [phone]   [place]    [phone]   [place]    [phone]   [place]    [phone]   [place] [phone]   [place]     [phone]       Call 911 or go to my nearest emergency department.     Managing Symptoms / Abstinence / Preventing Relapse:    Take all medicines as directed.  Carry a current list of medicines with you.    Use coping skills: distraction, relaxation, mindfulness    Do not use illicit (street) drugs, controlled substances (narcotics) or alcohol.    Go to all appointments.    Report symptoms to your care team including: thoughts of suicide, loss of sleep, increased confusion, or mood getting worse..    Community Resources/Supports and Discharge Planning:    Follow up with psychiatrist / main caregiver: NA    Next visit: NA    Follow up with your  therapist: ELIER   Next visit: ELIER    Go to group therapy and / or support groups at: NA    See your medical doctor about:  ELIER    Other:  ELIER    Copy of summary sent to: ELIER      Client Signature:___UNABLE TO SIGN DUE TO COVID______________________________   Date / Time:_1/13/2021 1230pm__________    Staff Signature:__Efrain Ely MA [facility]________________________________   Date / Time:__1/13/2021_________

## 2021-01-13 NOTE — DISCHARGE SUMMARY
"  Adult Dual Disorder Program Discharge Summary / Instructions     Patient: Jennifer Cantu MRN: [id number]  : 1990 Age:  30 year old Sex:  female    Admission Date: 12/15/2020  Discharge Date: 2021    Reason for Discharge: Continued Substance Use      Recommended higher level of treatment      Poor Attendance            Prognosis: Guarded      Client Progress Toward AchievingTreatment Plan Goals / Dimensions Risk Scale       Jennifer attended 29 of 51 scheduled MICD groups. Absences were due to no show, substance use, travel.      Diagnosis:   296.32 (F33.1) Major Depressive Disorder, Recurrent Episode, Moderate by history   Substance-Related & Addictive Disorders Alcohol Use Disorder   303.90 (F10.20) Severe   4. Other Diagnoses that is relevant to services: Bipolar Disorder, per history per patient's report.    301.83 (F60.3) Borderline Personality Disorder per history        Individualized Treatment Plan Goals/Progress:         Areas of Treatment Focus        Area of Treatment Focus:   Personal Safety  Start Date:    20    Dimension:   III. Emotional / Behavioral Condition    Description:    Patient reports history of suicide attempts. Most recent in .  Patient reports passive SI in the last two weeks. She denies intent and reports what keeps her from acting on thoughrs is \"Fear and I have suicide in my family and its a lot pain for people left behind and my daughter\"     Goal:  Target Date: 21 Status: Stopped  Client will notify staff when needing assistance to develop or implement a coping plan to manage suicidal or self injurious urges.  Client will use coping plan for safety, as needed.      Progress:   2021 STOPPED    Treatment Strategies:   Assist clients in establishing / strengthening support network  Assist to identify treatment goals  Assess / reassess for appropriate therapy program involvement, encourage participation in therapies  Engage in safety planning when " "indicated  Facilitate increased self awareness  Teach adaptive coping skills and communication skills        Area of Treatment Focus:   Symptom Stabilization and Management  Start Date:    12/22/20    Dimension:   III. Emotional / Behavioral Condition    Description:   The reason for seeking services at this time is: \" I know I'm an alcoholic and I can't stop and I need some support and I need to address issues instead of running for them. I think that outpatient is the best option because I am a mom and I do not want to leave her \"   The problem(s) began -got worse in April. Patient has attempted to resolve these concerns in the past through see's a therapist once a week.     Goal:  Target Date: 1/19/21 Status: Stopped  Will report on symptoms and identify skills to use to manage depression and anxiety      Progress:   GOAL STOPPED    Treatment Strategies:   Assist clients in establishing / strengthening support network  Assist to identify treatment goals  Assess / reassess for appropriate therapy program involvement, encourage participation in therapies  Facilitate increased self awareness  Provide education regarding symptom management  Teach adaptive coping skills and communication skills        Area of Treatment Focus:   Abstinence / Relapse Prevention  Start Date:    12/22/20    Dimension:   I. Acute Intoxication / Withdrawal Potential, IV. Treatment Acceptance / Resistance and V. Relapse    Description:    Patient was recently in detox and has completed other substance use disorder treatment.    Goal:  Target Date: 1/19/21 Status: Stopped  Client will identify my triggers for substance use by discussing in group and utilizing session education and handouts.       Progress:   1/12/2021 GOAL STOPPED    Treatment Strategies:   Assist clients in establishing / strengthening support network  Assist to identify treatment goals  Assess / reassess for appropriate therapy program involvement, encourage participation " in therapies  Facilitate increased self awareness  Provide education regarding relapse management  Teach adaptive coping skills and communication skills        Area of Treatment Focus:   Community Resources / Support and Discharge Planning  Start Date:    12/22/20    Dimension:   VI. Recovery Environment    Description:    Patient currently has a therapist. She is unemployed and lives with partner and daughter in their home.She also is involved with [facility] probation at this time.    Goal:  Target Date: 1/19/21 Status: Stopped  Pt will obtain a therapist and psychiatrist.       Progress:   1/12/2021 STOPPED    Treatment Strategies:   Assist clients in establishing / strengthening support network  Assist to identify treatment goals  Assist with discharge planning  Assess / reassess for appropriate therapy program involvement, encourage participation in therapies        Admit Discharge   PHQ-9 15 -   CHERRY-7 9 -   CGI 5 5         Additional Comments: NA    Completed By: Efrain Ely MA [facility]

## 2021-01-13 NOTE — PROGRESS NOTES
"Outpatient Mental Health Services - Adult     MY COPING PLAN FOR SAFETY     PATIENT'S NAME:    Jennifer Cantu  MRN:                           [id number]     SAFETY PLAN:     Step 1: Warning signs / cues (Thoughts, images, mood, situation, behavior) that a crisis may be developing:     ? Thoughts: \"I don't matter\" and \"Nothing makes it better\"  ? Images: none  ? Thinking Processes: ruminations (can't stop thinking about my problems): financial issues  ? Mood: worsening depression and intense worry  ? Behaviors: using alcohol  ? Situations: changes in symptoms: worsening symtpoms   ?    Step 2: Coping strategies - Things I can do to take my mind off of my problems without contacting another person (relaxation technique, physical activity):     ? Distress Tolerance Strategies:   Do self soothing - take a bath, do a face masks  ? Physical Activities: lift weights and ride bike  ? Focus on helpful thoughts:  \"I will get through this\"     Step 3: People and social settings that provide distraction:                 Name: Krishna (partner)    Phone:               Name: Rahul (Brother)         Phone:                                Step 4: Remind myself of people and things that are important to me and worth living for:  Daughter and Family     Step 5: When I am in crisis, I can ask these people to help me use my safety plan:                 Name: Thais (Cousin)          Phone:               Name: Krishna (partner)    Phone:      Step 6: Making the environment safe:      ? remove alcohol and be around others     Step 7: Professionals or agencies I can contact during a crisis:     ? Suicide Prevention Lifeline: 1-595-473-TALK (6645)  ? Crisis Text Line Service (available 24 hours a day, 7 days a week): Text [place] to 218986  ? Call  **CRISIS (773563) from a cell phone to talk to a team of professionals who can help you.          Crisis Services By [facility]: Phone Number:   [place]     [phone]   [place]    [phone]   [place]" [phone]   [place]    [phone]   [place]    [phone]   [place] [phone]   [place]     [phone]      ? Call 911 or go to my nearest emergency department.             I helped develop this safety plan and agree to use it when needed.  I have been given a copy of this plan.

## 2021-01-15 ENCOUNTER — HEALTH MAINTENANCE LETTER (OUTPATIENT)
Age: 31
End: 2021-01-15

## 2021-05-31 VITALS — BODY MASS INDEX: 29.93 KG/M2 | WEIGHT: 197.5 LBS | HEIGHT: 68 IN

## 2021-05-31 VITALS — BODY MASS INDEX: 32.81 KG/M2 | WEIGHT: 216.5 LBS | HEIGHT: 68 IN

## 2021-05-31 VITALS — WEIGHT: 186 LBS | HEIGHT: 68 IN | BODY MASS INDEX: 28.19 KG/M2

## 2021-05-31 VITALS — BODY MASS INDEX: 35.12 KG/M2 | HEIGHT: 68 IN | WEIGHT: 231.7 LBS

## 2021-06-11 NOTE — PROGRESS NOTES
Assessment/Plan:      Visit for Preoperative Exam.     27-year-old female with a past medical history of obesity, tobacco use disorder, IBS, depression who is here for preoperative examination prior to undergoing gastric sleeve surgery will be performed by Dr. Daigle at [facility] in [facility].  Patient has not discussed risks, benefits, alternative therapy with her bariatric surgeon but is planning on doing this immediately prior to surgery and [place].  Patient appears well on examination today with normal vital signs, BMI of 35.23, and normal cardiopulmonary examination. She does not have any prior history of cardiac or pulmonary disease except for that she is a chronic smoker.  Her activity level is at least 6 METS, able to ride her bike and hike without any chest pain or shortness of breath or any other symptoms.  Her revised cardiac risk index is 0.4% and is a low risk patient for this surgery. No Cardiology consultation or non-invasive testing.  No active cardiac conditions.   Patient is approved for surgery with anesthesia. Labs will be done as indicated including CMP, CBC with differential, PTT, INR, urinary pregnancy test results are currently pending and will be updated when available. EKG was performed today and per my read shows normal sinus rhythm, normal axis, no acute ST or T changes, no bundle branch block, no atrial or ventricular hypertrophy. Proceed with proposed surgery without additional clinical clarifications. I strongly cautioned patient about the risks of bariatric surgery and potential complications, including death, associated with this type of surgery and especially when it will be performed outside the [place].     Subjective:     Scheduled Procedure: Gastric sleeve.    Surgery Date:  6/3/2017   Surgery Location:  [facility] [place].   Surgeon:  Dr. Daigle    Current Outpatient Prescriptions   Medication Sig Dispense Refill      traZODone (DESYREL) 100 MG tablet Take 100 mg by mouth at bedtime as needed for sleep.       No current facility-administered medications for this visit.        Allergies   Allergen Reactions     Clindamycin Rash     Penicillins Rash       Immunization History   Administered Date(s) Administered     Influenza, inj, historic 11/20/2010     Tdap 09/02/2015       Patient Active Problem List   Diagnosis     Depression     IBS (irritable bowel syndrome)     Obesity (BMI 30-39.9)     Tobacco use disorder     Past Medical History:   Diagnosis Date     Alcohol abuse      Depression      IBS (irritable bowel syndrome)      Obesity        Social History     Social History     Marital status: Single     Spouse name: N/A     Number of children: N/A     Years of education: N/A     Occupational History      Self Employed     Social History Main Topics     Smoking status: Current Every Day Smoker     Types: Cigarettes     Start date: 6/26/2007     Smokeless tobacco: Not on file     Alcohol use Yes      Comment: 2-3 drinks once a week     Drug use: No     Sexual activity: Yes     Partners: Male     Birth control/ protection: IUD, Condom     Other Topics Concern     Not on file     Social History Narrative     No narrative on file       Past Surgical History:   Procedure Laterality Date     CHOLECYSTECTOMY  2011       History of Present Illness  27-year-old female with a past medical history of obesity, tobacco use disorder, IBS, depression who is here for preoperative examination prior to undergoing gastric sleeve surgery will be performed by Dr. Daigle at [facility] in [facility].  Patient has tried conservative therapy for weight loss including diet and exercise and has been unsuccessful.  Patient has not actually met up with her bariatric surgeon to discussed risks, benefits, and alternative therapy and is planning on doing this immediately prior to surgery done in [place].  She is planning to  "follow-up with the bariatrician Dr. Jorge at [facility] clinic after returning from [place] after her surgery.    Recent Health  Fever: no  Chills: no  Fatigue: no  Chest Pain/dypsnea: no - able to ride her bike once a week and goes hiking every couple weeks.   Cough: chronic smoker's cough in the morning for a couple years.   Urinary Frequency: no  Nausea: no  Vomiting: no  Diarrhea: no  Abdominal Pain: Yes - due to chronic IBS. Has had a colonoscopy before at [facility] and was normal. Celiac was negative. Rest of lab work was negative for patient. Does drink a lot of coffee. Associated with chronic constipation and diarrhea alternating.   Easy Bruising/bleeding: No  Lower Extremity Swelling: yes - dependent edema.   Poor Exercise Tolerance: No.     Most recent Health Maintenance Visit:  none     Pertinent History  Prior Anesthesia: yes  Previous Anesthesia Reaction:  no  Diabetes: no  Cardiovascular Disease: no  Pulmonary Disease: no, chronic smoker for 10 years.   Renal Disease: no  GI Disease: yes, IBS but no IBD.   Sleep Apnea: no  Thromboembolic Problems: no  Clotting Disorder: no  Bleeding Disorder: no  Transfusion Reaction: has never had blood transfusion  Impaired Immunity: no  Steroid use in the last 6 months: no  Frequent Aspirin use: no    No family history of anesthesia reaction, sudden death, clotting disorder and bleeding disorder    Social history of patient does not wear denture or partial plates, there is no transfusion refusal, NSAID use and there are no concerns regarding care after surgery    After surgery, the patient plans to recover at home with family.    Review of Systems  A 12 point comprehensive review of systems was negative except as noted.          Objective:         Vitals:    06/26/17 1251   BP: 112/68   Pulse: 88   Resp: 20   Weight: (!) 231 lb 11.2 oz (105.1 kg)   Height: 5' 8\" (1.727 m)       Physical Exam:  General Appearance: Alert, cooperative, no distress, " appears stated age  Head: Normocephalic, without obvious abnormality, atraumatic  Eyes: PERRL, conjunctiva/corneas clear, EOM's intact  Ears: Normal TM's and external ear canals, both ears  Nose: Nares normal, septum midline,mucosa normal, no drainage  Throat: Lips, mucosa, and tongue normal; teeth and gums normal  Neck: Supple, symmetrical, trachea midline, no adenopathy;  thyroid: not enlarged, symmetric, no tenderness/mass/nodules; no carotid bruit or JVD  Back: Symmetric, no curvature, ROM normal, no CVA tenderness  Lungs: Clear to auscultation bilaterally, respirations unlabored  Breasts: Not examined.   Heart: Regular rate and rhythm, S1 and S2 normal, no murmur, rub, or gallop,   Abdomen: Soft, non-tender, bowel sounds active all four quadrants,  no masses, no organomegaly  Pelvic:Not examined  Extremities: Extremities normal, atraumatic, no cyanosis or edema  Skin: Skin color, texture, turgor normal, no rashes or lesions  Lymph nodes: Cervical, supraclavicular, and axillary nodes normal  Neurologic: Normal

## 2021-06-11 NOTE — PROGRESS NOTES
Bariatric Follow Up Visit with a History of Previous Bariatric Surgery     Date of visit: 7/11/2017  Physician: Shayla Robbins MD  Primary Care Provider:  Vani Chirinos PA-C  Jennifer Cantu   27 y.o.  female    Date of Surgery: 7/3/17  Initial Weight: 235#  Initial BMI: 35  Today's Weight:   Wt Readings from Last 1 Encounters:   07/11/17 216 lb 8 oz (98.2 kg)     Body mass index is 32.92 kg/(m^2).      Assessment and Plan     Assessment: Jennifer is a 27 y.o. year old female who is 8 days s/p  Sleeve Gastrectomy with Dr. Pilo Castaneda in [facility]      Encounter Diagnoses   Name Primary?     Polycystic ovary syndrome      Postoperative malabsorption Yes     Constipation      Post-operative pain      Tobacco use      Tachycardia with heart rate 121-140 beats per minute          Current Outpatient Prescriptions:      aluminum-magnesium hydroxide 200-200 mg/5 mL suspension, Take 15 mL by mouth every 12 (twelve) hours as needed for indigestion., Disp: , Rfl:      CODEINE SULFATE ORAL, Take 50 mg by mouth daily., Disp: , Rfl:      cyanocobalamin, vitamin B-12, 200 mcg/spray SpSn, Place 1 spray under the tongue daily., Disp: , Rfl:      levoFLOXacin (LEVAQUIN) 250 MG tablet, Take 750 mg by mouth daily., Disp: , Rfl:      levonorgestrel (MIRENA) 20 mcg/24 hr (5 years) IUD, 1 each by Intrauterine route once., Disp: , Rfl:      melatonin 3 mg Tab tablet, Take 3 mg by mouth at bedtime as needed., Disp: , Rfl:      multivitamin Chew, Chew 2 tablets daily., Disp: , Rfl:      omeprazole (PRILOSEC) 20 MG capsule, Take 20 mg by mouth daily., Disp: , Rfl:      traZODone (DESYREL) 50 MG tablet, Take  mg by mouth at bedtime as needed., Disp: , Rfl:      ketorolac (TORADOL) 10 mg tablet, Place 30 mg under the tongue every 8 (eight) hours as needed. , Disp: , Rfl:      nicotine 21-14-7 mg/24 hr PTDS, Place 1 patch on the skin daily., Disp: 56 each, Rfl: 0  No current facility-administered medications for this visit.      Plan: CT abdomen. No tobacco. Nicotine patch 21mg/ kit #56, Follow HE sleeve diet advancement protocol. Thiamine injection today.  White binder provided. Protocol with dates provided.    Return in about 1 week (around 2017). Call anytime with questions or concerns.    Bariatric Surgery Review     Interim History/LifeChanges: She had a sleeve in [place] Monday, . She flew home on . Went to the ER that night with fevers. ER at [facility] where CT was normal. Bloodwork OK (CBC, CMP, UA/UC). Was see by a surgeon but Bariatric Surgeon was upstairs and patient wanted to go home. She received 2L fluids and was told to continue tylenol and her levaquin. She last took her Codeine on  (2 days ago). Her fevers came down with Tylenol. Last Tylenol was 2 days ago. Fever remains down off of tylenol, diclofenac and toradol. She wore compression stockings on the plane ride home. Constipated despite Senna. No HX eating disorders. Has had EGD showing GERD but symptoms resolved with 50# weight loss.   She has a B-12 SL spray, D 2,000, BA calcium citrate and a BA  MVI and separate iron. Dizzy, light headed. Some pain in the morning when she gets up. No breathing difficulties, no calf pain. Continues to smoke. 2 cigarettes today so far 1pm. On omeprazole. Stopped her ketorolac. Following her guidelines. Getting 40 oz water and premier protein, gen pro flavorless protein. Has had her period since the surgery with Mirena which is unusual. Her mother had bariatric surgery and became alcoholic, her father  of addiction related MI in his 50's. She was told that she may have borderline personality, however it is undocumented.    Patient Concerns: incisions, fever, dehydration, needs follow up.    Medication changes: Continues levaquin. One tablet remains. Has stopped toradol, diclofenac, codeine, and tylenol.    Vitamin Intake:   B-12   spray   MVI  bariatric Advantage   Vitamin D  2,000   Calcium    citrate BA     Other  iron 65mg              LABS: ordered  Reviewed ER CT, CXR and labs [place]  Nausea no  Vomiting no  Constipation yes  Diarrhea no  Rashes no  Hair Loss no  Calf tenderness no  Breathing difficulty no  Reactive Hypoglycemia no  Light Headedness yes-almost fainted yesterday at store with her mom   Julien OK    12 point ROS as above and otherwise negative      Habits:  Alcohol: no  Tobacco: daily  Caffeine none since pre op diet  NSAIDS no ketorolac for 2 d, no diclofenac for 2 days  Exercise Routine: intentionally walking, none yesterday-was too orthostatic yesterday though  3 meals/day tries  Protein first yes  60 grams/day  Water Separate from meals yes  Calorie Containing Beverages none  Restaurant eating/wk no  Sleeping OK-10 hours without trazodone  Stress has off of work until Friday-works from home.  CPAP: NA  Contraception: mirena    Social History     Social History     Social History     Marital status: Single     Spouse name: N/A     Number of children: N/A     Years of education: N/A     Occupational History      Self Employed     Social History Main Topics     Smoking status: Current Every Day Smoker     Years: 10.00     Types: Cigarettes     Start date: 6/26/2007     Smokeless tobacco: Former User      Comment: age 17 to present up to 1 ppd     Alcohol use 0.0 - 1.8 oz/week     0 - 3 Glasses of wine per week     Drug use: No     Sexual activity: Not Currently     Partners: Male     Birth control/ protection: IUD     Other Topics Concern     Not on file     Social History Narrative    Single. 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       Past Medical History     Past Medical History:   Diagnosis Date     Alcohol abuse      Depression      GERD (gastroesophageal reflux disease)     was told she had GERD. Symtoms resolved with 50# weight loss     Heartburn      IBS (irritable bowel syndrome)   "    Obesity      Polycystic ovary syndrome      Tobacco use      Problem List     Patient Active Problem List   Diagnosis     Depression     IBS (irritable bowel syndrome)     Obesity (BMI 30-39.9)     Tobacco use disorder     Polycystic ovary syndrome     Tobacco use     Medications     Current Outpatient Prescriptions   Medication Sig Note     aluminum-magnesium hydroxide 200-200 mg/5 mL suspension Take 15 mL by mouth every 12 (twelve) hours as needed for indigestion.      CODEINE SULFATE ORAL Take 50 mg by mouth daily.      cyanocobalamin, vitamin B-12, 200 mcg/spray SpSn Place 1 spray under the tongue daily.      levoFLOXacin (LEVAQUIN) 250 MG tablet Take 750 mg by mouth daily. 7/11/2017: Received from: [facility] Received Sig: Take 750 mg by mouth daily     levonorgestrel (MIRENA) 20 mcg/24 hr (5 years) IUD 1 each by Intrauterine route once.      melatonin 3 mg Tab tablet Take 3 mg by mouth at bedtime as needed. 7/11/2017: Received from: [facility] Received Sig: Take 3 mg by mouth nightly as needed for sleep     multivitamin Chew Chew 2 tablets daily.      omeprazole (PRILOSEC) 20 MG capsule Take 20 mg by mouth daily.      traZODone (DESYREL) 50 MG tablet Take  mg by mouth at bedtime as needed. 7/11/2017: Received from: [facility] Received Sig: Take 1-2 tablets ( mg) by mouth nightly as needed for sleep     ketorolac (TORADOL) 10 mg tablet Place 30 mg under the tongue every 8 (eight) hours as needed.  7/11/2017: Received from: [facility] Received Sig: Take 30 mg by mouth every 8 hours as needed for moderate pain     nicotine 21-14-7 mg/24 hr PTDS Place 1 patch on the skin daily.      Surgical History     Past Surgical History  She has a past surgical history that includes Cholecystectomy (2011); Small intestine surgery (07/03/2017); and [place] tooth extraction.    Objective-Exam     Constitutional:  /59  Pulse (!) 116  Resp 16  Ht 5' 8\" (1.727 m)  Wt 216 lb 8 oz (98.2 kg)  LMP 06/12/2017 " "(Approximate)  SpO2 96%  BMI 32.92 kg/m2  Height: 5' 8\" (1.727 m) (7/11/2017 11:52 AM)  Initial Weight: 231 lbs (7/11/2017 11:52 AM)  Weight: 216 lb 8 oz (98.2 kg) (7/11/2017 11:52 AM)  Weight loss from initial: 14.5 (7/11/2017 11:52 AM)  % Weight loss: 6.28 % (7/11/2017 11:52 AM)  BMI (Calculated): 32.9 (7/11/2017 11:52 AM)  SpO2: 96 % (7/11/2017 11:52 AM)  NSAIDS: No (7/11/2017 11:52 AM)  Pain Scale: 2 (7/11/2017 11:52 AM)  General:  Pleasant and in no acute distress,    Eyes:  EOMI  ENT:  Airway 2.5+ Red mucous membranes-drinking red gatorade G-2  Neck:  Supple, No LAD, No thyromegaly, No carotid bruits appreciated  Respiratory: Normal respiratory effort, no cough, wheezes or crackles  CV:  Tachycardic rate 1-teens to 120;s and Rhythm,no murmurs, pulses 2+, no calf tenderness, no LE edema  Gastrointestinal: Abdomen epigastric tenderness to palpation, mild distention, bowel sounds active, no rebounding, no guarding  Musculoskeletal: muscle mass WNL  Skin: color pink hair full, incisions with 2mm erythema surrounding port sites, no fluctuance, no discharge  Neurological: No tremor, normal gait  Psychiatric: alert and oriented X3, mood and affect normal    Counseling     We reviewed the important post op bariatric recommendations:  -eating 3 meals daily  -eating protein first, getting >60gm protein daily  -eating slowly, chewing food well  -avoiding/limiting calorie containing beverages  -drinking water 15-30 minutes before or after meals  -choosing wheat, not white with breads, crackers, pastas, gurjit, bagels, tortillas, rice  -limiting restaurant or cafeteria eating to twice a week or less    We discussed the importance of restorative sleep and stress management in maintaining a healthy weight.  We discussed the National Weight Control Registry healthy weight maintenance strategies and ways to optimize metabolism.  We discussed the importance of physical activity including cardiovascular and strength training in " maintaining a healthier weight.    We discussed the importance of life-long vitamin supplementation and life-long  follow-up.    Jennifer was reminded that, to avoid marginal ulcers she should avoid tobacco at all, alcohol in excess, caffeine in excess, and NSAIDS (unless indicated for cardioprotection or othewise and opposed by a PPI).    Shayla Robbins MD, [facility].  7/11/2017  12:42 PM    75 minutes spent with patient. >50% in counseling and coordination of care.

## 2021-06-11 NOTE — PROGRESS NOTES
Pt is 2 weeks post op LSG in [place] and is here for f/u.  See flowsheet.      Deirdre Sanchez RN, N  [facility]  P [phone]  F [phone]

## 2021-06-12 NOTE — PROGRESS NOTES
Post-op Surgical Weight Loss Diet Evaluation     Assessment:  Pt presents for 1-month post-op RD visit, s/p LSG on 7/3/2017 with Dr in [place]. Today we reviewed current eating habits and level of physical activity, and instructed on the changes that are required for successful bariatric outcomes.    Patient Progress: pt in the hospital 18 days; sleeve leak, spleen removed (open) was working with Dr. Negrete.  Recently started on the pureed yesterday - pureed black beans, premier protein with fruit and yogurt - advised on skins/peels/seeds/membranes  - 70ml per hour for 12 hours with tube feed (unknown for how long she will be on tube feed)   - upper GI did not reveal a leak after spleen surgery     Pt's Initial Weight: 231 lbs  Weight: 197 lb 8 oz (89.6 kg)  Weight loss from initial: 33.5  % Weight loss: 14.5 %    Body mass index is 30.03 kg/(m^2).    Vitamins   Multi Vit with Iron: yes   Calcium Citrate: no  B12: yes  D3: no  +will start vitamin D and CaCitrate 8/28 when she starts soft     Do you experience hunger? No  Nausea: yes  Vomiting: no - dry heaving   Diarrhea: yes  Constipation: no  Hair loss:no    Fluid-meal separation:  Fluids are  30min before and 30 minutes after meals.  The patient and I reviewed the anatomy of the bypass and why  fluids from a meal is so important.    Fluid Intake  Water: 24oz w/ 1 protein shake   Caffeine: none  Alcohol: none  Carbonation: none  Milk: none  Juice: none    Discussed the importance of adequate hydration after surgery and the goal of 64+ oz of fluid daily.   The patient understands the importance of avoiding all carbonated, caffeinated, and sweetened drinks; and instead choosing 64oz plain water.    Exercise  none    Pt's understands that 30-60 minutes of daily activity is an important part of bariatric surgery success.   Encouraged pt to incorporate strength training exercise along with cardiovascular exercise as well, most days of the  "week.      PES statement:    1. (NC-1.4) Altered GI Function related to Alteration in gastrointestinal tract structure and/or function/ Decreased functional length of the GI tract as evidenced by Weight loss of 14.5% initial body weight; sleeve gastrectomy    Intervention    Discussion  Discussed pureed and soft diet progression; soft diet start on 8/28    Instructions  1. Include 15-20gm protein at each meal, along with protein supplement/\"planned protein containing snack\" of 15-30gm protein, to reach goal of 60-80 gm protein daily.  2. Increase fluid intake to 64oz daily: choose plain or calorie/carbonation-free beverages.  3. Incorporate daily structured activity, 30-60 minutes most days of the week  4. Recommended pt to start taking: Multi Vit + iron 2x/day, 3823-6213 mcg of Sublingual B-12 daily (MVI and calcium can be taken at the same time)  5. Read food labels more consistently: keeping total fat grams <10, total sugar grams <10, fiber >3gm per serving.  6. Increase vegetable/fruit intake, by having a vegetable or fruit with each meal daily.  7. Practice plate method: 1/2 plate lean/low fat protein source, vegetable/fruit, <25% of plate complex carbohydrates.  8. Separate fluids 30 minutes before/after meal times.  9. Practice eating off of smaller plates/bowls, chewing to applesauce consistency, taking 20-30 minutes to eat in a calm/relaxed environment without distractions of tv/email/cell phone.    Handouts provided:  Sleeve Pureed and Sleeve soft w/ plate guide     Monitor/Evaluation    Pt to follow up in 1 month with Dr. Robbins and 2 months with ANDRY for 3 month PO visit    Time In: 2:15p  Time Out: 3:00p      ABN signed: Yes      "

## 2021-06-13 NOTE — PROGRESS NOTES
Bariatric Follow Up Visit with a History of Previous Bariatric Surgery     Date of visit: 9/21/2017  Physician: Shayla Robbins MD  Primary Care Provider:  Nayely Kenny MD  Jennifer Cantu   27 y.o.  female    Date of Surgery: 7/3/2017  Initial Weight: 235#  Initial BMI: 35  Today's Weight:   Wt Readings from Last 1 Encounters:   09/21/17 186 lb (84.4 kg)     Body mass index is 28.28 kg/(m^2).      Assessment and Plan     Assessment: Jennifer is a 27 y.o. year old female who is almost 3 months s/p  Sleeve Gastrectomy with Dr. Joaquin Daigle  Jennifer Cantu feels as if she has not yet achieved the goals she hoped to accomplish through bariatric surgery and weight loss.  She    Encounter Diagnoses   Name Primary?     Postoperative malabsorption Yes     Tobacco abuse      Abnormal craving          Current Outpatient Prescriptions:      ALPRAZolam (XANAX) 0.5 MG tablet, Take 0.25 mg by mouth daily as needed., Disp: , Rfl:      cyanocobalamin, vitamin B-12, 200 mcg/spray SpSn, Place 1 spray under the tongue daily., Disp: , Rfl:      levonorgestrel (MIRENA) 20 mcg/24 hr (5 years) IUD, 1 each by Intrauterine route once., Disp: , Rfl:      multivitamin Chew, Chew 2 tablets daily., Disp: , Rfl:      omeprazole (PRILOSEC) 20 MG capsule, Take 20 mg by mouth daily., Disp: , Rfl:      traZODone (DESYREL) 50 MG tablet, Take  mg by mouth at bedtime as needed., Disp: , Rfl:      buPROPion HCl, smoking deter, (ZYBAN) 150 mg 12 hr tablet, Take 1 tablet (150 mg total) by mouth 2 (two) times a day., Disp: 180 tablet, Rfl: prn     naltrexone (DEPADE) 50 mg tablet, Take 1/2 tablet with the evening meal for one week then take 1/2 tablet with breakfast and with the evening meal, Disp: 90 tablet, Rfl: prn    Plan: Naltrexone and wellbutrin for tobacco cessation and cravings. Contemplated using it in the past for alcohol cravings. Lbs today. Reviewed [facility] labs.  [facility] to follow enteral feeding and communicate with our  "clinic. Follow labs peridically. CBC, prealbumin, thiamine, CMP. Plan gradual increase in PO feeding and decrease in enteral feeds.     Return in about 3 months (around 12/21/2017).    Bariatric Surgery Review     Interim History/LifeChanges: [place] [facility] [place] and had a splenectomy. Out August 9th. Still has a feeding tube. 12 hours at night. 3 bags. Planning on starting work Monday.  Her mom has been staying with her and is going home now. She lives in [facility] [place].  Needs her feeding tube out at some point.  thought maybe Early/Mid December. Fainted in the ER and had a pleural effusion 9/8/2017. Normal CXR 9/18/2017 after having it drained. CT and f/u with pulmonary and had it drained on 9/18/2017.. Doing well and stamina increasing. Walking daily.    Patient Concerns: Care for feeding tube and evaluation when to get it out.     Medication changes: none    Vitamin Intake:   B-12   spray   MVI  women's one a day (2/d)   Vitamin D  none   Calcium   bariatric advantage     Other  Feeding Tube               LABS: \"Reviewed  Ordered thiamine, PTH, D and zinc as I didn't see them in EPIC.  Nausea no  Vomiting no  Constipation no  Diarrhea no  Rashes tape from wound vac was irritating and wound is healing by secondary intention  Hair Loss a little  Calf tenderness no  Breathing difficulty no  Reactive Hypoglycemia no  Light Headedness yes   Moods pretty even    12 point ROS as above and otherwise negative      Habits:  Alcohol: no  Tobacco: 1/3ppd,  Now up to 1 ppd  Caffeine a little  NSAIDS no  Exercise Routine: walking up to an hour daily  3 meals/day coffee with 30 gm premier protein, Biopro 1-2/wk, hint water 2/d 20 oz each, eggs, cheese, frozen green beans or asparagus, potatoes  Protein first yes  60+++grams/day  Water Separate from meals NA  Calorie Containing Beverages biopro  Restaurant eating/wk none  Sleeping 8 hours  Stress high, daughter is having emotional problems, financial " stress  CPAP: NA  Contraception: mirena OK for 1 more year.    Social History     Social History     Social History     Marital status: Single     Spouse name: N/A     Number of children: N/A     Years of education: N/A     Occupational History      Self Employed     Social History Main Topics     Smoking status: Current Every Day Smoker     Years: 10.00     Types: Cigarettes     Start date: 6/26/2007     Smokeless tobacco: Former User      Comment: age 17 to present up to 1 ppd     Alcohol use 0.0 - 1.8 oz/week     0 - 3 Glasses of wine per week     Drug use: No     Sexual activity: Not Currently     Partners: Male     Birth control/ protection: IUD     Other Topics Concern     Not on file     Social History Narrative    Single. 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 Daigle       GERD (gastroesophageal reflux disease)     was told she had GERD. Symtoms resolved with 50# weight loss     Heartburn      IBS (irritable bowel syndrome)      Obesity      Polycystic ovary syndrome      Tobacco use      Problem List     Patient Active Problem List   Diagnosis     Depression     IBS (irritable bowel syndrome)     Obesity (BMI 30-39.9)     Tobacco use disorder     Polycystic ovary syndrome     Tobacco use     Medications     Current Outpatient Prescriptions   Medication Sig Note     ALPRAZolam (XANAX) 0.5 MG tablet Take 0.25 mg by mouth daily as needed. 9/21/2017: Received from: [facility] Received Sig: Take 0.25 mg by mouth daily as needed for Anxiety.     cyanocobalamin, vitamin B-12, 200 mcg/spray SpSn Place 1 spray under the tongue daily.      levonorgestrel (MIRENA) 20 mcg/24 hr (5 years) IUD 1 each by Intrauterine route  "once.      multivitamin Chew Chew 2 tablets daily.      omeprazole (PRILOSEC) 20 MG capsule Take 20 mg by mouth daily.      traZODone (DESYREL) 50 MG tablet Take  mg by mouth at bedtime as needed. 7/11/2017: Received from: [facility] Received Sig: Take 1-2 tablets ( mg) by mouth nightly as needed for sleep     buPROPion HCl, smoking deter, (ZYBAN) 150 mg 12 hr tablet Take 1 tablet (150 mg total) by mouth 2 (two) times a day.      naltrexone (DEPADE) 50 mg tablet Take 1/2 tablet with the evening meal for one week then take 1/2 tablet with breakfast and with the evening meal      Surgical History     Past Surgical History  She has a past surgical history that includes Cholecystectomy (2011); Small intestine surgery (07/03/2017); [place] tooth extraction; and Splenectomy (07/23/2017).    Objective-Exam     Constitutional:  /65  Pulse 84  Resp 18  Ht 5' 8\" (1.727 m)  Wt 186 lb (84.4 kg)  SpO2 100%  BMI 28.28 kg/m2  Height: 5' 8\" (1.727 m) (9/21/2017  1:04 PM)  Initial Weight: 231 lbs (9/21/2017  1:04 PM)  Weight: 186 lb (84.4 kg) (9/21/2017  1:04 PM)  Weight loss from initial: 45 (9/21/2017  1:04 PM)  % Weight loss: 19.48 % (9/21/2017  1:04 PM)  BMI (Calculated): 28.3 (9/21/2017  1:04 PM)  SpO2: 100 % (9/21/2017  1:04 PM)  NSAIDS: No (7/11/2017 11:52 AM)  Pain Scale: 2 (7/11/2017 11:52 AM)  General:  Pleasant and in no acute distress   Eyes:  EOMI  ENT:  Airway 1+  Moist mucous membranes  Neck:  Supple, No LAD, No thyromegaly, No carotid bruits appreciated  Respiratory: Normal respiratory effort, no cough, wheezes or crackles  CV:  Regular rate and Rhythm,no murmurs, pulses 2+, no calf tenderness, no LE edema  Gastrointestinal: Abdomen NT/ND, BS+  Musculoskeletal: muscle mass WNL  Skin: color pink hair full, incisions nicely healed  Neurological: No tremor, normal gait  Psychiatric: alert and oriented X3, mood and affect normal    Counseling     We reviewed the important post op bariatric " recommendations:  -eating 3 meals daily  -eating protein first, getting >60gm protein daily  -eating slowly, chewing food well  -avoiding/limiting calorie containing beverages  -drinking water 15-30 minutes before or after meals  -choosing wheat, not white with breads, crackers, pastas, gurjit, bagels, tortillas, rice  -limiting restaurant or cafeteria eating to twice a week or less    We discussed the importance of restorative sleep and stress management in maintaining a healthy weight.  We discussed the National Weight Control Registry healthy weight maintenance strategies and ways to optimize metabolism.  We discussed the importance of physical activity including cardiovascular and strength training in maintaining a healthier weight.    We discussed the importance of life-long vitamin supplementation and life-long  follow-up.    Jennifer was reminded that, to avoid marginal ulcers she should avoid tobacco at all, alcohol in excess, caffeine in excess, and NSAIDS (unless indicated for cardioprotection or othewise and opposed by a PPI).    Shayla Robbins MD, [facility].  9/21/2017  1:22 PM     30 minutes spent with patient. >50% in counseling and coordination of care.

## 2021-06-13 NOTE — PROGRESS NOTES
Zinc needs to be re-drawn.  error. Margarita will notify pt.  New order placed for Zinc.    Isra Dumont [facility]  P: [phone]  F: [phone]

## 2021-10-02 ENCOUNTER — HEALTH MAINTENANCE LETTER (OUTPATIENT)
Age: 31
End: 2021-10-02

## 2022-01-22 ENCOUNTER — HEALTH MAINTENANCE LETTER (OUTPATIENT)
Age: 32
End: 2022-01-22

## 2022-09-03 ENCOUNTER — HEALTH MAINTENANCE LETTER (OUTPATIENT)
Age: 32
End: 2022-09-03

## 2023-04-29 ENCOUNTER — HEALTH MAINTENANCE LETTER (OUTPATIENT)
Age: 33
End: 2023-04-29